# Patient Record
Sex: MALE | Race: WHITE | NOT HISPANIC OR LATINO | Employment: FULL TIME | URBAN - METROPOLITAN AREA
[De-identification: names, ages, dates, MRNs, and addresses within clinical notes are randomized per-mention and may not be internally consistent; named-entity substitution may affect disease eponyms.]

---

## 2019-03-25 ENCOUNTER — HOSPITAL ENCOUNTER (OUTPATIENT)
Dept: OTHER | Facility: HOSPITAL | Age: 59
Discharge: HOME | End: 2019-03-25
Attending: INTERNAL MEDICINE

## 2019-03-25 LAB
25(OH)D3 SERPL-MCNC: 82.7 NG/ML (ref 30–100)
ALBUMIN SERPL-MCNC: 4.2 G/DL (ref 3.5–5)
ALBUMIN/GLOB SERPL: 1.3 {RATIO} (ref 1.4–2.6)
ALP SERPL-CCNC: 105 U/L (ref 56–119)
ALT SERPL-CCNC: 27 U/L (ref 10–40)
ANION GAP SERPL CALC-SCNC: 18 MMOL/L (ref 8–19)
AST SERPL-CCNC: 26 U/L (ref 15–50)
BASOPHILS # BLD AUTO: 0.04 10*3/UL (ref 0–0.2)
BASOPHILS NFR BLD AUTO: 0.8 % (ref 0–3)
BILIRUB SERPL-MCNC: 0.63 MG/DL (ref 0.2–1.3)
BUN SERPL-MCNC: 11 MG/DL (ref 5–25)
BUN/CREAT SERPL: 10 {RATIO} (ref 6–20)
CALCIUM SERPL-MCNC: 9.5 MG/DL (ref 8.7–10.4)
CHLORIDE SERPL-SCNC: 100 MMOL/L (ref 99–111)
CHOLEST SERPL-MCNC: 175 MG/DL (ref 107–200)
CHOLEST/HDLC SERPL: 2.6 {RATIO} (ref 3–6)
CK SERPL-CCNC: 281 U/L (ref 57–374)
CONV ABS IMM GRAN: 0.02 10*3/UL (ref 0–0.2)
CONV CO2: 26 MMOL/L (ref 22–32)
CONV IMMATURE GRAN: 0.4 % (ref 0–1.8)
CONV TOTAL PROTEIN: 7.4 G/DL (ref 6.3–8.2)
CREAT UR-MCNC: 1.06 MG/DL (ref 0.7–1.2)
DEPRECATED RDW RBC AUTO: 42.5 FL (ref 35.1–43.9)
EOSINOPHIL # BLD AUTO: 0.14 10*3/UL (ref 0–0.7)
EOSINOPHIL # BLD AUTO: 2.7 % (ref 0–7)
ERYTHROCYTE [DISTWIDTH] IN BLOOD BY AUTOMATED COUNT: 12.5 % (ref 11.6–14.4)
EST. AVERAGE GLUCOSE BLD GHB EST-MCNC: 123 MG/DL
GFR SERPLBLD BASED ON 1.73 SQ M-ARVRAT: >60 ML/MIN/{1.73_M2}
GLOBULIN UR ELPH-MCNC: 3.2 G/DL (ref 2–3.5)
GLUCOSE SERPL-MCNC: 116 MG/DL (ref 70–99)
HBA1C MFR BLD: 16 G/DL (ref 14–18)
HBA1C MFR BLD: 5.9 % (ref 3.5–5.7)
HCT VFR BLD AUTO: 46 % (ref 42–52)
HDLC SERPL-MCNC: 67 MG/DL (ref 40–60)
LDLC SERPL CALC-MCNC: 93 MG/DL (ref 70–100)
LYMPHOCYTES # BLD AUTO: 1.47 10*3/UL (ref 1–5)
MCH RBC QN AUTO: 32.3 PG (ref 27–31)
MCHC RBC AUTO-ENTMCNC: 34.8 G/DL (ref 33–37)
MCV RBC AUTO: 92.7 FL (ref 80–96)
MONOCYTES # BLD AUTO: 0.48 10*3/UL (ref 0.2–1.2)
MONOCYTES NFR BLD AUTO: 9.3 % (ref 3–10)
NEUTROPHILS # BLD AUTO: 3.02 10*3/UL (ref 2–8)
NEUTROPHILS NFR BLD AUTO: 58.4 % (ref 30–85)
NRBC CBCN: 0 % (ref 0–0.7)
OSMOLALITY SERPL CALC.SUM OF ELEC: 288 MOSM/KG (ref 273–304)
PLATELET # BLD AUTO: 151 10*3/UL (ref 130–400)
PMV BLD AUTO: 9.4 FL (ref 9.4–12.4)
POTASSIUM SERPL-SCNC: 4.7 MMOL/L (ref 3.5–5.3)
RBC # BLD AUTO: 4.96 10*6/UL (ref 4.7–6.1)
SODIUM SERPL-SCNC: 139 MMOL/L (ref 135–147)
TRIGL SERPL-MCNC: 77 MG/DL (ref 40–150)
VARIANT LYMPHS NFR BLD MANUAL: 28.4 % (ref 20–45)
VLDLC SERPL-MCNC: 15 MG/DL (ref 5–37)
WBC # BLD AUTO: 5.17 10*3/UL (ref 4.8–10.8)

## 2019-11-14 ENCOUNTER — CONVERSION ENCOUNTER (OUTPATIENT)
Dept: GASTROENTEROLOGY | Facility: CLINIC | Age: 59
End: 2019-11-14
Attending: INTERNAL MEDICINE

## 2020-02-04 ENCOUNTER — HOSPITAL ENCOUNTER (OUTPATIENT)
Dept: GASTROENTEROLOGY | Facility: HOSPITAL | Age: 60
Setting detail: HOSPITAL OUTPATIENT SURGERY
Discharge: HOME | End: 2020-02-04
Attending: INTERNAL MEDICINE

## 2021-03-03 ENCOUNTER — HOSPITAL ENCOUNTER (OUTPATIENT)
Dept: VACCINE CLINIC | Facility: HOSPITAL | Age: 61
Discharge: HOME | End: 2021-03-03
Attending: INTERNAL MEDICINE

## 2021-03-24 ENCOUNTER — HOSPITAL ENCOUNTER (OUTPATIENT)
Dept: VACCINE CLINIC | Facility: HOSPITAL | Age: 61
Discharge: HOME | End: 2021-03-24
Attending: INTERNAL MEDICINE

## 2023-02-08 ENCOUNTER — HOSPITAL ENCOUNTER (EMERGENCY)
Facility: HOSPITAL | Age: 63
Discharge: HOME | End: 2023-02-08
Attending: STUDENT IN AN ORGANIZED HEALTH CARE EDUCATION/TRAINING PROGRAM | Admitting: STUDENT IN AN ORGANIZED HEALTH CARE EDUCATION/TRAINING PROGRAM
Payer: COMMERCIAL

## 2023-02-08 VITALS
TEMPERATURE: 98.3 F | DIASTOLIC BLOOD PRESSURE: 80 MMHG | SYSTOLIC BLOOD PRESSURE: 122 MMHG | OXYGEN SATURATION: 99 % | HEART RATE: 71 BPM | BODY MASS INDEX: 33.68 KG/M2 | WEIGHT: 248.68 LBS | RESPIRATION RATE: 18 BRPM | HEIGHT: 72 IN

## 2023-02-08 DIAGNOSIS — I10 HYPERTENSION, UNSPECIFIED TYPE: Primary | ICD-10-CM

## 2023-02-08 LAB
ALBUMIN SERPL-MCNC: 4 G/DL (ref 3.5–5.2)
ALBUMIN/GLOB SERPL: 1.3 G/DL
ALP SERPL-CCNC: 133 U/L (ref 39–117)
ALT SERPL W P-5'-P-CCNC: 27 U/L (ref 1–41)
ANION GAP SERPL CALCULATED.3IONS-SCNC: 12.9 MMOL/L (ref 5–15)
AST SERPL-CCNC: 21 U/L (ref 1–40)
BASOPHILS # BLD AUTO: 0.03 10*3/MM3 (ref 0–0.2)
BASOPHILS NFR BLD AUTO: 0.5 % (ref 0–1.5)
BILIRUB SERPL-MCNC: 0.5 MG/DL (ref 0–1.2)
BUN SERPL-MCNC: 14 MG/DL (ref 8–23)
BUN/CREAT SERPL: 13.5 (ref 7–25)
CALCIUM SPEC-SCNC: 9.6 MG/DL (ref 8.6–10.5)
CHLORIDE SERPL-SCNC: 100 MMOL/L (ref 98–107)
CO2 SERPL-SCNC: 24.1 MMOL/L (ref 22–29)
CREAT SERPL-MCNC: 1.04 MG/DL (ref 0.76–1.27)
DEPRECATED RDW RBC AUTO: 43.8 FL (ref 37–54)
EGFRCR SERPLBLD CKD-EPI 2021: 80.7 ML/MIN/1.73
EOSINOPHIL # BLD AUTO: 0.18 10*3/MM3 (ref 0–0.4)
EOSINOPHIL NFR BLD AUTO: 3 % (ref 0.3–6.2)
ERYTHROCYTE [DISTWIDTH] IN BLOOD BY AUTOMATED COUNT: 13 % (ref 12.3–15.4)
GLOBULIN UR ELPH-MCNC: 3.2 GM/DL
GLUCOSE SERPL-MCNC: 157 MG/DL (ref 65–99)
HCT VFR BLD AUTO: 43.5 % (ref 37.5–51)
HGB BLD-MCNC: 15.1 G/DL (ref 13–17.7)
HOLD SPECIMEN: NORMAL
HOLD SPECIMEN: NORMAL
IMM GRANULOCYTES # BLD AUTO: 0.03 10*3/MM3 (ref 0–0.05)
IMM GRANULOCYTES NFR BLD AUTO: 0.5 % (ref 0–0.5)
LYMPHOCYTES # BLD AUTO: 1.57 10*3/MM3 (ref 0.7–3.1)
LYMPHOCYTES NFR BLD AUTO: 26 % (ref 19.6–45.3)
MCH RBC QN AUTO: 32.1 PG (ref 26.6–33)
MCHC RBC AUTO-ENTMCNC: 34.7 G/DL (ref 31.5–35.7)
MCV RBC AUTO: 92.4 FL (ref 79–97)
MONOCYTES # BLD AUTO: 0.49 10*3/MM3 (ref 0.1–0.9)
MONOCYTES NFR BLD AUTO: 8.1 % (ref 5–12)
NEUTROPHILS NFR BLD AUTO: 3.73 10*3/MM3 (ref 1.7–7)
NEUTROPHILS NFR BLD AUTO: 61.9 % (ref 42.7–76)
NRBC BLD AUTO-RTO: 0 /100 WBC (ref 0–0.2)
PLATELET # BLD AUTO: 198 10*3/MM3 (ref 140–450)
PMV BLD AUTO: 9.8 FL (ref 6–12)
POTASSIUM SERPL-SCNC: 4 MMOL/L (ref 3.5–5.2)
PROT SERPL-MCNC: 7.2 G/DL (ref 6–8.5)
RBC # BLD AUTO: 4.71 10*6/MM3 (ref 4.14–5.8)
SODIUM SERPL-SCNC: 137 MMOL/L (ref 136–145)
WBC NRBC COR # BLD: 6.03 10*3/MM3 (ref 3.4–10.8)
WHOLE BLOOD HOLD COAG: NORMAL
WHOLE BLOOD HOLD SPECIMEN: NORMAL

## 2023-02-08 PROCEDURE — 80053 COMPREHEN METABOLIC PANEL: CPT | Performed by: STUDENT IN AN ORGANIZED HEALTH CARE EDUCATION/TRAINING PROGRAM

## 2023-02-08 PROCEDURE — 85025 COMPLETE CBC W/AUTO DIFF WBC: CPT | Performed by: STUDENT IN AN ORGANIZED HEALTH CARE EDUCATION/TRAINING PROGRAM

## 2023-02-08 PROCEDURE — 36415 COLL VENOUS BLD VENIPUNCTURE: CPT | Performed by: STUDENT IN AN ORGANIZED HEALTH CARE EDUCATION/TRAINING PROGRAM

## 2023-02-08 PROCEDURE — 99283 EMERGENCY DEPT VISIT LOW MDM: CPT

## 2023-02-08 RX ORDER — ATORVASTATIN CALCIUM 10 MG/1
TABLET, FILM COATED ORAL
COMMUNITY

## 2023-02-08 RX ORDER — CANDESARTAN 16 MG/1
TABLET ORAL EVERY 24 HOURS
COMMUNITY

## 2023-02-08 RX ORDER — ESCITALOPRAM OXALATE 10 MG/1
TABLET ORAL
COMMUNITY

## 2023-02-08 RX ORDER — ASPIRIN 325 MG
TABLET ORAL
COMMUNITY

## 2023-02-08 RX ORDER — ALLOPURINOL 300 MG/1
TABLET ORAL
COMMUNITY
Start: 2022-12-24

## 2023-02-09 NOTE — DISCHARGE INSTRUCTIONS
Continue to monitor your blood pressure.    Follow-up with your PCP to discuss blood pressure readings.    Return for any new concerns.

## 2023-02-09 NOTE — ED TRIAGE NOTES
Pt to ED from home with reports of frontal headache, facial pressure, and /130.      Pt states he called brother in law who is doctor and was instructed to come to ED.

## 2023-02-09 NOTE — ED PROVIDER NOTES
Time: 7:26 PM EST  Date of encounter:  2/8/2023  Independent Historian/Clinical History and Information was obtained by:   Patient  Chief Complaint   Patient presents with   • Headache   • Hypertension       History is limited by: N/A    History of Present Illness:  Patient is a 62 y.o. year old male who presents to the emergency department for evaluation of hypertension.  Patient states he checked his blood pressure at home due to having a headache and it was in the 200s over 100s.  Patient does have known hypertension and has taken his blood pressure medications today.  Patient denies any recent head injury, or blurry vision.  Patient does state he has a mild headache.  Patient states he is unsure if this was related to the sinus congestion that he has had for the past couple of weeks.  (Provider in triage, Darrell Tucker PA-C)    Patient reports that he checked blood pressure with a wrist monitor.  He denies shortness of breath, blurry vision or chest pain.    HPI    Patient Care Team  Primary Care Provider: Maria Elena Jimenez MD    Past Medical History:     No Known Allergies  Past Medical History:   Diagnosis Date   • Anxiety    • Depression    • Detached retina    • Gout    • Hypercholesterolemia    • Hypertension      Past Surgical History:   Procedure Laterality Date   • COLONOSCOPY N/A    • HERNIA REPAIR     • RETINAL DETACHMENT REPAIR     • VASECTOMY       History reviewed. No pertinent family history.    Home Medications:  Prior to Admission medications    Not on File        Social History:   Social History     Tobacco Use   • Smoking status: Never   • Smokeless tobacco: Never   Substance Use Topics   • Alcohol use: Yes     Comment: weekends and special occ         Review of Systems:  Review of Systems   Constitutional: Negative for chills and fever.   HENT: Positive for postnasal drip and sinus pressure. Negative for congestion, ear pain and sore throat.    Eyes: Negative for pain and visual disturbance.  "  Respiratory: Negative for cough, chest tightness and shortness of breath.    Cardiovascular: Negative for chest pain.   Gastrointestinal: Negative for abdominal pain, diarrhea, nausea and vomiting.   Genitourinary: Negative for flank pain and hematuria.   Musculoskeletal: Negative for joint swelling.   Skin: Negative for pallor.   Neurological: Positive for headaches. Negative for dizziness and seizures.   All other systems reviewed and are negative.       Physical Exam:  /80 (BP Location: Right arm, Patient Position: Sitting)   Pulse 71   Temp 98.3 °F (36.8 °C) (Oral)   Resp 18   Ht 182.9 cm (72\")   Wt 113 kg (248 lb 10.9 oz)   SpO2 99%   BMI 33.73 kg/m²     Physical Exam  Vitals and nursing note reviewed.   Constitutional:       General: He is not in acute distress.     Appearance: Normal appearance. He is not ill-appearing or toxic-appearing.   HENT:      Head: Normocephalic and atraumatic.      Nose: Nose normal.      Comments: Maxillary and frontal sinuses transilluminate well     Mouth/Throat:      Lips: Pink.      Mouth: Mucous membranes are moist.      Pharynx: No oropharyngeal exudate.      Tonsils: No tonsillar exudate.      Comments: Cobblestoning posterior pharynx  Eyes:      General: No scleral icterus.     Extraocular Movements: Extraocular movements intact.      Conjunctiva/sclera: Conjunctivae normal.   Cardiovascular:      Rate and Rhythm: Normal rate and regular rhythm.      Pulses:           Radial pulses are 2+ on the right side and 2+ on the left side.      Heart sounds: Normal heart sounds.   Pulmonary:      Effort: Pulmonary effort is normal. No respiratory distress.      Breath sounds: Normal breath sounds.   Abdominal:      General: Abdomen is protuberant. Bowel sounds are normal. There is no distension.      Palpations: Abdomen is soft.      Tenderness: There is no abdominal tenderness.   Musculoskeletal:         General: Normal range of motion.      Cervical back: Normal " range of motion and neck supple.   Skin:     General: Skin is warm and dry.      Coloration: Skin is not cyanotic.   Neurological:      Mental Status: He is alert and oriented to person, place, and time. Mental status is at baseline.      Sensory: Sensation is intact.      Motor: Motor function is intact.      Coordination: Coordination is intact.      Gait: Gait is intact.   Psychiatric:         Attention and Perception: Attention and perception normal.         Mood and Affect: Mood normal.                   Procedures:  Procedures      Medical Decision Making:      Comorbidities that affect care:        External Notes reviewed:          The following orders were placed and all results were independently analyzed by me:  Orders Placed This Encounter   Procedures   • Blood Pressure Device   • Comprehensive Metabolic Panel   • [place] Draw   • CBC Auto Differential   • CBC & Differential   • Green Top (Gel)   • Lavender Top   • Gold Top - SST   • Light Blue Top       Medications Given in the Emergency Department:  Medications - No data to display     ED Course:    The patient was initially evaluated in the triage area where orders were placed. The patient was later dispositioned by JOSTIN Rojas.      The patient was advised to stay for completion of workup which includes but is not limited to communication of labs and radiological results, reassessment and plan. The patient was advised that leaving prior to disposition by a provider could result in critical findings that are not communicated to the patient.     ED Course as of 02/09/23 0451   Wed Feb 08, 2023   1926 PROVIDER IN TRIAGE  Patient was evaluated by me in triage, Darrell Tucekr PA-C.  Orders were placed and patient is currently awaiting final results and disposition.  [MD]      ED Course User Index  [MD] Darrell Tucker PA-C       Labs:    Lab Results (last 24 hours)     Procedure Component Value Units Date/Time    CBC & Differential [[id number]]   (Normal) Collected: 02/08/23 1922    Specimen: Blood Updated: 02/08/23 2049    Narrative:      The following orders were created for panel order CBC & Differential.  Procedure                               Abnormality         Status                     ---------                               -----------         ------                     CBC Auto Differential[[id number]]        Normal              Final result                 Please view results for these tests on the individual orders.    Comprehensive Metabolic Panel [[id number]]  (Abnormal) Collected: 02/08/23 1922    Specimen: Blood Updated: 02/08/23 2115     Glucose 157 mg/dL      BUN 14 mg/dL      Creatinine 1.04 mg/dL      Sodium 137 mmol/L      Potassium 4.0 mmol/L      Chloride 100 mmol/L      CO2 24.1 mmol/L      Calcium 9.6 mg/dL      Total Protein 7.2 g/dL      Albumin 4.0 g/dL      ALT (SGPT) 27 U/L      AST (SGOT) 21 U/L      Alkaline Phosphatase 133 U/L      Total Bilirubin 0.5 mg/dL      Globulin 3.2 gm/dL      A/G Ratio 1.3 g/dL      BUN/Creatinine Ratio 13.5     Anion Gap 12.9 mmol/L      eGFR 80.7 mL/min/1.73     Narrative:      GFR Normal >60  Chronic Kidney Disease <60  Kidney Failure <15      CBC Auto Differential [[id number]]  (Normal) Collected: 02/08/23 1922    Specimen: Blood Updated: 02/08/23 2049     WBC 6.03 10*3/mm3      RBC 4.71 10*6/mm3      Hemoglobin 15.1 g/dL      Hematocrit 43.5 %      MCV 92.4 fL      MCH 32.1 pg      MCHC 34.7 g/dL      RDW 13.0 %      RDW-SD 43.8 fl      MPV 9.8 fL      Platelets 198 10*3/mm3      Neutrophil % 61.9 %      Lymphocyte % 26.0 %      Monocyte % 8.1 %      Eosinophil % 3.0 %      Basophil % 0.5 %      Immature Grans % 0.5 %      Neutrophils, Absolute 3.73 10*3/mm3      Lymphocytes, Absolute 1.57 10*3/mm3      Monocytes, Absolute 0.49 10*3/mm3      Eosinophils, Absolute 0.18 10*3/mm3      Basophils, Absolute 0.03 10*3/mm3      Immature Grans, Absolute 0.03 10*3/mm3      nRBC 0.0 /100 WBC             Imaging:    No Radiology Exams Resulted Within Past 24 Hours      Differential Diagnosis and Discussion:      Headache: Differential diagnosis includes but is not limited to migraine, cluster headache, hypertension, tumor, subarachnoid bleeding, pseudotumor cerebri, temporal arteritis, infections, tension headache, and TMJ syndrome.        MDM  Number of Diagnoses or Management Options  Hypertension, unspecified type  Diagnosis management comments: Patient presented with concern for high blood pressure and a headache.  Headache was nearly resolved upon arrival.  Patient reports that he used a wrist monitor to check his blood pressure and it read very high twice.  Upon arrival to ED patient's blood pressure was only minimally elevated.  Patient and significant other think that their monitor may be faulty.  On recheck blood pressure was within normal limits.  I will give the patient prescription for new blood pressure monitor.  He will continue to monitor his blood pressure and seek care should it become elevated again. I do not believe that the patient has an acute emergency medical condition requiring additional emergency management at this time. The patient is currently stable for outpatient treatment and continuation of care. Important signs and symptoms that would warrant return to the emergency department were reviewed. The patient was provided the opportunity to ask questions. All questions were addressed and the patient was discharged from the ED. The patient demonstrated understanding and agreed to plan       Amount and/or Complexity of Data Reviewed  Clinical lab tests: reviewed and ordered  Decide to obtain previous medical records or to obtain history from someone other than the patient: yes    Risk of Complications, Morbidity, and/or Mortality  Presenting problems: moderate  Diagnostic procedures: low  Management options: low    Patient Progress  Patient progress: improved           Patient Care  Considerations:    CT HEAD: I considered ordering a noncontrast CT of the head, however Patient's headache was nearly completely resolved during visit      Consultants/Shared Management Plan:        Social Determinants of Health:    Patient is independent, reliable, and has access to care.       Disposition and Care Coordination:    Discharged: The patient is suitable and stable for discharge with no need for consideration of observation or admission.        Final diagnoses:   Hypertension, unspecified type        ED Disposition     ED Disposition   Discharge    Condition   Stable    Comment   --             This medical record created using voice recognition software.           Summer Maya, APRN  02/09/23 0457

## 2023-07-13 PROBLEM — Z86.010 HISTORY OF COLON POLYPS: Status: ACTIVE | Noted: 2023-07-13

## 2023-07-13 PROBLEM — Z86.0100 HISTORY OF COLON POLYPS: Status: ACTIVE | Noted: 2023-07-13

## 2023-08-17 ENCOUNTER — OFFICE VISIT (OUTPATIENT)
Dept: ORTHOPEDIC SURGERY | Facility: CLINIC | Age: 63
End: 2023-08-17
Payer: COMMERCIAL

## 2023-08-17 VITALS
BODY MASS INDEX: 33.59 KG/M2 | HEART RATE: 68 BPM | HEIGHT: 72 IN | DIASTOLIC BLOOD PRESSURE: 85 MMHG | SYSTOLIC BLOOD PRESSURE: 128 MMHG | OXYGEN SATURATION: 96 % | WEIGHT: 248 LBS

## 2023-08-17 DIAGNOSIS — M25.561 RIGHT KNEE PAIN, UNSPECIFIED CHRONICITY: Primary | ICD-10-CM

## 2023-08-17 DIAGNOSIS — M17.11 OSTEOARTHRITIS OF RIGHT KNEE, UNSPECIFIED OSTEOARTHRITIS TYPE: ICD-10-CM

## 2023-08-17 RX ORDER — DICLOFENAC SODIUM 75 MG/1
75 TABLET, DELAYED RELEASE ORAL 2 TIMES DAILY
Qty: 60 TABLET | Refills: 0 | Status: SHIPPED | OUTPATIENT
Start: 2023-08-17

## 2023-08-17 RX ORDER — POLYETHYLENE GLYCOL-3350 AND ELECTROLYTES 236; 6.74; 5.86; 2.97; 22.74 G/274.31G; G/274.31G; G/274.31G; G/274.31G; G/274.31G
POWDER, FOR SOLUTION ORAL
COMMUNITY
Start: 2023-07-11

## 2023-08-17 RX ORDER — DICLOFENAC SODIUM 75 MG/1
TABLET, DELAYED RELEASE ORAL
COMMUNITY
Start: 2023-08-01

## 2023-08-17 NOTE — PROGRESS NOTES
"Chief Complaint  Initial Evaluation of the Right Knee     Subjective      Agus Church presents to [facility] for initial evaluation of the right knee.  He has had pain in the right knee for years.  He has had injections that lasted a couple of week, anti inflammatories and exercises in the past.  He has changed his gait over the years that had decreased some pain.  He had X rays from his PCP.  He stands a lot of work and has had increase pain with standing and ambulation.     No Known Allergies     Social History     Socioeconomic History    Marital status:    Tobacco Use    Smoking status: Never    Smokeless tobacco: Never   Vaping Use    Vaping Use: Never used   Substance and Sexual Activity    Alcohol use: Yes     Comment: weekends and special occ    Drug use: Defer    Sexual activity: Defer        I reviewed the patient's chief complaint, history of present illness, review of systems, past medical history, surgical history, family history, social history, medications, and allergy list.     Review of Systems     Constitutional: Denies fevers, chills, weight loss  Cardiovascular: Denies chest pain, shortness of breath  Skin: Denies rashes, acute skin changes  Neurologic: Denies headache, loss of consciousness        Vital Signs:   /85   Pulse 68   Ht 182.9 cm (72\")   Wt 112 kg (248 lb)   SpO2 96%   BMI 33.63 kg/mý          Physical Exam  General: Alert. No acute distress    Ortho Exam        RIGHT KNEE Flexion 120. Extension 0. Stable to varus/valgus stress. Stable to anterior/posterior drawer. Neurovascularly intact. Negative Nicki. Negative Lachman. Positive EHL, FHL, HS and TA. Sensation intact to light touch all 5 nerves of the foot. Ambulates with Antalgic gait. Patella is well tracking. Calf supple, non-tender. Positive tenderness to the medial joint line. Positive tenderness to the lateral joint line. Positive Crepitus. Good strength to hamstrings, " quadriceps, dorsiflexors, and plantar flexors.  Knee Extensor Mechanism intact        Procedures      Imaging Results (Most Recent)       None             Result Review :     X-Ray Report:  Right knee X-Ray  Indication: Evaluation of the right knee  AP/Lateral and [place] view(s)  Findings: Moderately Advanced degenerative arthritis. Mild patella tilt  Prior studies available for comparison: yes       PROCEDURE: Right knee series  REASON FOR EXAM: Chronic right knee pain  COMPARISON: None  TECHNIQUE: AP lateral oblique and sunrise views of the right knee were obtained  FINDINGS: There is no fracture or effusion. There is some osteophyte formation at the patellofemoral joint space there is mild  narrowing of the medial joint space with osteophyte formation from the medial femoral condyle  IMPRESSION: No acute findings. Medial joint and patellofemoral joint degenerative changes are present        Assessment and Plan     Diagnoses and all orders for this visit:    1. Right knee pain, unspecified chronicity (Primary)    2. Osteoarthritis of right knee, unspecified osteoarthritis type        Discussed the treatment plan with the patient. I reviewed the X-rays that were obtained at [facility] with the patient.     Discussed the risks and benefits of conservative measures.      Prescribed anti inflammatories.     Submit for visco supplementation.     Call or return if worsening symptoms.    Follow Up     After Visco supplementation is approved.       Patient was given instructions and counseling regarding his condition or for health maintenance advice. Please see specific information pulled into the AVS if appropriate.     Scribed for Jacqueline Jack MD by Kelsy Lucero MA.  08/17/23   15:37 EDT    I have personally performed the services described in this document as scribed by the above individual and it is both accurate and complete. Jacqueline Jack MD 08/18/23

## 2023-09-05 ENCOUNTER — TELEPHONE (OUTPATIENT)
Dept: ORTHOPEDIC SURGERY | Facility: CLINIC | Age: 63
End: 2023-09-05
Payer: COMMERCIAL

## 2023-09-05 NOTE — TELEPHONE ENCOUNTER
PATIENT WAS SEEN ON 08/17 WITH DR. BESS AND VISCO AUTH WAS ORDERED. HE IS CALLING TO CHECK STATUS OF THAT REQUEST. 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 HE VOICED UNDERSTANDING AND IS REQUESTING A CALL TO LET HIM KNOW OF ANY STATUS UPDATE.

## 2023-09-05 NOTE — TELEPHONE ENCOUNTER
Left message for the patient to call back. Wanted to inform the patient that it might not be till next week before we could have an update on gel injections. Wanted to let the patient know so he is aware that we are working on injections but may take a little time.

## 2023-09-12 ENCOUNTER — TELEPHONE (OUTPATIENT)
Dept: GASTROENTEROLOGY | Facility: CLINIC | Age: 63
End: 2023-09-12
Payer: COMMERCIAL

## 2023-09-12 NOTE — TELEPHONE ENCOUNTER
Dr. Berman advised that he will need to be out of the office on 10.6.2023.      Patient has an upcoming ENDO procedure. I called patient to reschedule. No answer. Left message for patient to call back.      Procedure: screening colon

## 2023-09-14 ENCOUNTER — TELEPHONE (OUTPATIENT)
Dept: ORTHOPEDIC SURGERY | Facility: CLINIC | Age: 63
End: 2023-09-14
Payer: COMMERCIAL

## 2023-09-14 NOTE — TELEPHONE ENCOUNTER
DENIED:  CALLED AND LEFT A MSG TO THE PT REGARDING THE DENIAL AND IF HE WANTS TO MAKE AN APPT TO DISCUSS OTHER OPTIONS TO PLS CALL US BACK (9-14-23)

## 2023-09-14 NOTE — TELEPHONE ENCOUNTER
----- Message from Arely Maya sent at 9/6/2023  2:48 PM EDT -----  [place], denied right knee synvsic one injection per Ebony. Please call and schedule with Flores Lomeli or Judi to discuss other options. Thanks

## 2023-09-19 ENCOUNTER — OFFICE VISIT (OUTPATIENT)
Dept: ORTHOPEDIC SURGERY | Facility: CLINIC | Age: 63
End: 2023-09-19
Payer: COMMERCIAL

## 2023-09-19 VITALS
OXYGEN SATURATION: 95 % | BODY MASS INDEX: 33.59 KG/M2 | DIASTOLIC BLOOD PRESSURE: 79 MMHG | HEART RATE: 81 BPM | WEIGHT: 248 LBS | SYSTOLIC BLOOD PRESSURE: 124 MMHG | HEIGHT: 72 IN

## 2023-09-19 DIAGNOSIS — M17.11 OSTEOARTHRITIS OF RIGHT KNEE, UNSPECIFIED OSTEOARTHRITIS TYPE: Primary | ICD-10-CM

## 2023-09-19 RX ADMIN — LIDOCAINE HYDROCHLORIDE 5 ML: 10 INJECTION, SOLUTION INFILTRATION; PERINEURAL at 15:52

## 2023-09-19 RX ADMIN — TRIAMCINOLONE ACETONIDE 40 MG: 40 INJECTION, SUSPENSION INTRA-ARTICULAR; INTRAMUSCULAR at 15:52

## 2023-09-19 NOTE — PROGRESS NOTES
"Chief Complaint  Follow-up of the Right Knee    Subjective      Agus Church presents to [facility] for right knee pain and osteoarthritits. He states this has been going on for 3-4 years. He has had previous right knee injections administered by his PCP, last injection was in June, and those provided minimal relief. He has not tried PT yet. He has been prescribed diclofenac and that has helped some. He states he is always standing on his feet at work (he is an ) and that causes a lot of pain for him.  Insurance did not approve Visco injection at this time. He states he is ready to discuss joint replacement surgery, although would like to defer this until December.    Objective   No Known Allergies    Vital Signs:   /79   Pulse 81   Ht 182.9 cm (72\")   Wt 112 kg (248 lb)   SpO2 95%   BMI 33.63 kg/m²       Physical Exam    Constitutional: Awake, alert. Well nourished appearance.    Integumentary: Warm, dry, intact. No obvious rashes.    HENT: Atraumatic, normocephalic.   Respiratory: Non labored respirations .   Cardiovascular: Intact peripheral pulses.    Psychiatric: Normal mood and affect. A&O X3    Ortho Exam  Right knee: Skin is warm, dry, and intact.  Tenderness to palpation of joint lines.  Crepitus with ROM.  Full knee extension and flexion to 125 degrees.  Full plantarflexion and dorsiflexion of the ankle.  Sensation intact light touch.  Distal neurovascular intact.  Smooth sit to stand transition.  Patient fully weightbearing with nonantalgic gait.    Imaging Results (Most Recent)       None             Large Joint Arthrocentesis  Date/Time: 9/19/2023 3:52 PM  Consent given by: patient  Site marked: site marked  Timeout: Immediately prior to procedure a time out was called to verify the correct patient, procedure, equipment, support staff and site/side marked as required   Supporting Documentation  Indications: pain   Procedure Details  Location: - "   Location: RIGHT KNEE.Needle gauge: 21G.  Medications administered: 5 mL lidocaine 1 %; 40 mg triamcinolone acetonide 40 MG/ML  Patient tolerance: patient tolerated the procedure well with no immediate complications          Assessment and Plan   Problem List Items Addressed This Visit    None  Visit Diagnoses       Osteoarthritis of right knee, unspecified osteoarthritis type    -  Primary          Follow Up   Return in about 6 weeks (around 10/31/2023).    Tobacco Use: Low Risk     Smoking Tobacco Use: Never    Smokeless Tobacco Use: Never    Passive Exposure: Not on file     Patient is a non-smoker.  Did not discuss tobacco cessation options.    Patient Instructions   Right knee steroid injection administered in office today.  Patient advised on 3 months duration between injections.  Patient is considering right TKA later this year, hoping to have this done in early December.  Follow-up in early November with repeat x-rays of the knee.  We will discuss TKA at that time, if knee remains painful.  Call with changes or concerns.      Patient was given instructions and counseling regarding his condition or for health maintenance advice. Please see specific information pulled into the AVS if appropriate.

## 2023-09-19 NOTE — PATIENT INSTRUCTIONS
Right knee steroid injection administered in office today.  Patient advised on 3 months duration between injections.  Patient is considering right TKA later this year, hoping to have this done in early December.  Follow-up in early November with repeat x-rays of the knee.  We will discuss TKA at that time, if knee remains painful.  Call with changes or concerns.

## 2023-09-21 RX ORDER — TRIAMCINOLONE ACETONIDE 40 MG/ML
40 INJECTION, SUSPENSION INTRA-ARTICULAR; INTRAMUSCULAR
Status: COMPLETED | OUTPATIENT
Start: 2023-09-19 | End: 2023-09-19

## 2023-09-21 RX ORDER — LIDOCAINE HYDROCHLORIDE 10 MG/ML
5 INJECTION, SOLUTION INFILTRATION; PERINEURAL
Status: COMPLETED | OUTPATIENT
Start: 2023-09-19 | End: 2023-09-19

## 2023-10-13 NOTE — PRE-PROCEDURE INSTRUCTIONS
"Instructed on date and arrival time of 1030. Come to entrance \"C\". Must have  over age 18 to drive home.  May have two visitors; however, children under 12 must stay in waiting room.  Discussed clear liquid diet (no red or purple) and bowel prep.  May take medications as usual except for blood thinners, diabetic medications, and weight loss medications.  Bring list of medications.  Verbalized understanding of instructions given.  Instructed to call for questions or concerns.  "

## 2023-10-25 ENCOUNTER — ANESTHESIA EVENT (OUTPATIENT)
Dept: GASTROENTEROLOGY | Facility: HOSPITAL | Age: 63
End: 2023-10-25
Payer: COMMERCIAL

## 2023-10-25 ENCOUNTER — ANESTHESIA (OUTPATIENT)
Dept: GASTROENTEROLOGY | Facility: HOSPITAL | Age: 63
End: 2023-10-25
Payer: COMMERCIAL

## 2023-10-25 ENCOUNTER — HOSPITAL ENCOUNTER (OUTPATIENT)
Facility: HOSPITAL | Age: 63
Setting detail: HOSPITAL OUTPATIENT SURGERY
Discharge: HOME | End: 2023-10-25
Attending: INTERNAL MEDICINE | Admitting: INTERNAL MEDICINE
Payer: COMMERCIAL

## 2023-10-25 VITALS
RESPIRATION RATE: 19 BRPM | BODY MASS INDEX: 33.19 KG/M2 | DIASTOLIC BLOOD PRESSURE: 73 MMHG | WEIGHT: 244.71 LBS | SYSTOLIC BLOOD PRESSURE: 106 MMHG | TEMPERATURE: 97.2 F | OXYGEN SATURATION: 93 % | HEART RATE: 57 BPM

## 2023-10-25 DIAGNOSIS — Z86.010 HISTORY OF COLON POLYPS: ICD-10-CM

## 2023-10-25 PROCEDURE — 25810000003 LACTATED RINGERS PER 1000 ML

## 2023-10-25 PROCEDURE — 88305 TISSUE EXAM BY PATHOLOGIST: CPT | Performed by: INTERNAL MEDICINE

## 2023-10-25 PROCEDURE — 45385 COLONOSCOPY W/LESION REMOVAL: CPT | Performed by: INTERNAL MEDICINE

## 2023-10-25 PROCEDURE — 25010000002 PROPOFOL 10 MG/ML EMULSION

## 2023-10-25 RX ORDER — LIDOCAINE HYDROCHLORIDE 20 MG/ML
INJECTION, SOLUTION EPIDURAL; INFILTRATION; INTRACAUDAL; PERINEURAL AS NEEDED
Status: DISCONTINUED | OUTPATIENT
Start: 2023-10-25 | End: 2023-10-25 | Stop reason: SURG

## 2023-10-25 RX ORDER — SODIUM CHLORIDE, SODIUM LACTATE, POTASSIUM CHLORIDE, CALCIUM CHLORIDE 600; 310; 30; 20 MG/100ML; MG/100ML; MG/100ML; MG/100ML
30 INJECTION, SOLUTION INTRAVENOUS CONTINUOUS
Status: DISCONTINUED | OUTPATIENT
Start: 2023-10-25 | End: 2023-10-25 | Stop reason: HOSPADM

## 2023-10-25 RX ORDER — PROPOFOL 10 MG/ML
VIAL (ML) INTRAVENOUS AS NEEDED
Status: DISCONTINUED | OUTPATIENT
Start: 2023-10-25 | End: 2023-10-25 | Stop reason: SURG

## 2023-10-25 RX ADMIN — LIDOCAINE HYDROCHLORIDE 100 MG: 20 INJECTION, SOLUTION EPIDURAL; INFILTRATION; INTRACAUDAL; PERINEURAL at 12:22

## 2023-10-25 RX ADMIN — PROPOFOL 50 MG: 10 INJECTION, EMULSION INTRAVENOUS at 12:26

## 2023-10-25 RX ADMIN — SODIUM CHLORIDE, POTASSIUM CHLORIDE, SODIUM LACTATE AND CALCIUM CHLORIDE: 600; 310; 30; 20 INJECTION, SOLUTION INTRAVENOUS at 12:20

## 2023-10-25 RX ADMIN — PROPOFOL 50 MG: 10 INJECTION, EMULSION INTRAVENOUS at 12:28

## 2023-10-25 RX ADMIN — PROPOFOL 200 MCG/KG/MIN: 10 INJECTION, EMULSION INTRAVENOUS at 12:22

## 2023-10-25 NOTE — ANESTHESIA PREPROCEDURE EVALUATION
Anesthesia Evaluation     NPO Solid Status: > 8 hours  NPO Liquid Status: > 2 hours           Airway   Mallampati: II  TM distance: >3 FB  Neck ROM: full  Large neck circumference and No difficulty expected  Dental - normal exam     Pulmonary    Cardiovascular     (+) hypertension less than 2 medications, hyperlipidemia      Neuro/Psych  (+) psychiatric history Depression and Anxiety  GI/Hepatic/Renal/Endo      Musculoskeletal     Abdominal    Substance History      OB/GYN          Other                          Anesthesia Plan    ASA 2     general   total IV anesthesia  intravenous induction     Anesthetic plan, risks, benefits, and alternatives have been provided, discussed and informed consent has been obtained with: patient.    Plan discussed with CRNA.        CODE STATUS:

## 2023-10-25 NOTE — H&P
Pre Procedure History & Physical    Chief Complaint:   Past history of colon polyps    Subjective     HPI:   History of colon polyps    Past Medical History:   Past Medical History:   Diagnosis Date    Anxiety     Colon polyp     Depression     Detached retina     Gout     Hypercholesterolemia     Hypertension        Past Surgical History:  Past Surgical History:   Procedure Laterality Date    COLONOSCOPY N/A 2020    HERNIA REPAIR      RETINAL DETACHMENT REPAIR      VASECTOMY         Family History:  Family History   Problem Relation Age of Onset    Colon cancer Neg Hx        Social History:   reports that he has never smoked. He has never used smokeless tobacco. He reports current alcohol use. Drug use questions deferred to the physician.    Medications:   Medications Prior to Admission   Medication Sig Dispense Refill Last Dose    allopurinol (ZYLOPRIM) 300 MG tablet    Past Week    atorvastatin (LIPITOR) 10 MG tablet    Past Week    candesartan (ATACAND) 16 MG tablet Daily.   Past Week    Cyanocobalamin (VITAMIN B-12 CR PO) Take  by mouth.   Past Week    escitalopram (LEXAPRO) 10 MG tablet Lexapro 10 mg oral tablet take 1 tablet (10 mg) by oral route once daily   Active   Past Week    loratadine-pseudoephedrine (CLARITIN-D 24-hour)  MG per 24 hr tablet Loratadine-D  mg oral tablet extended release 24 hr take 1 tablet by oral route once daily   Active   Past Week    VITAMIN D PO Take  by mouth.   Past Week    diclofenac (VOLTAREN) 75 MG EC tablet Take 1 tablet by mouth 2 (Two) Times a Day. 60 tablet 0     GaviLyte-G 236 g solution  (Patient not taking: Reported on 9/19/2023)          Allergies:  Patient has no known allergies.        Objective     Blood pressure 138/85, pulse 64, temperature 97.5 °F (36.4 °C), temperature source Temporal, resp. rate 16, weight 111 kg (244 lb 11.4 oz), SpO2 95%.    Physical Exam   Constitutional: Pt is oriented to person, place, and time and well-developed,  well-nourished, and in no distress.   Mouth/Throat: Oropharynx is clear and moist.   Neck: Normal range of motion.   Cardiovascular: Normal rate, regular rhythm and normal heart sounds.    Pulmonary/Chest: Effort normal and breath sounds normal.   Abdominal: Soft. Nontender  Skin: Skin is warm and dry.   Psychiatric: Mood, memory, affect and judgment normal.     Assessment & Plan     Diagnosis:  Past history of colon polyps    Anticipated Surgical Procedure:  Colonoscopy    The risks, benefits, and alternatives of this procedure have been discussed with the patient or the responsible party- the patient understands and agrees to proceed.

## 2023-10-25 NOTE — ANESTHESIA POSTPROCEDURE EVALUATION
Patient: Agus Church    Procedure Summary       Date: 10/25/23 Room / Location: [facility] ENDOSCOPY 3 / [facility]    Anesthesia Start: 1220 Anesthesia Stop: 1243    Procedure: COLONOSCOPY WITH COLD SNARE POLYPECTOMY Diagnosis:       History of colon polyps      (History of colon polyps [Z86.010])    Surgeons: Anthony Berman MD Provider:     Anesthesia Type: general ASA Status: 2            Anesthesia Type: general    Vitals  Vitals Value Taken Time   /73 10/25/23 1258   Temp 36.2 °C (97.2 °F) 10/25/23 1257   Pulse 65 10/25/23 1258   Resp 19 10/25/23 1257   SpO2 95 % 10/25/23 1258   Vitals shown include unfiled device data.        Post Anesthesia Care and Evaluation    Patient location during evaluation: bedside  Patient participation: complete - patient participated  Level of consciousness: awake  Pain score: 0  Pain management: adequate    Airway patency: patent  Anesthetic complications: No anesthetic complications  PONV Status: none  Cardiovascular status: acceptable and stable  Respiratory status: acceptable

## 2023-10-27 LAB
CYTO UR: NORMAL
LAB AP CASE REPORT: NORMAL
LAB AP CLINICAL INFORMATION: NORMAL
PATH REPORT.FINAL DX SPEC: NORMAL
PATH REPORT.GROSS SPEC: NORMAL

## 2023-11-07 ENCOUNTER — PREP FOR SURGERY (OUTPATIENT)
Dept: OTHER | Facility: HOSPITAL | Age: 63
End: 2023-11-07
Payer: COMMERCIAL

## 2023-11-07 ENCOUNTER — OFFICE VISIT (OUTPATIENT)
Dept: ORTHOPEDIC SURGERY | Facility: CLINIC | Age: 63
End: 2023-11-07
Payer: COMMERCIAL

## 2023-11-07 VITALS — SYSTOLIC BLOOD PRESSURE: 145 MMHG | HEART RATE: 81 BPM | OXYGEN SATURATION: 93 % | DIASTOLIC BLOOD PRESSURE: 77 MMHG

## 2023-11-07 DIAGNOSIS — M17.11 PRIMARY OSTEOARTHRITIS OF RIGHT KNEE: Primary | ICD-10-CM

## 2023-11-07 DIAGNOSIS — M17.11 OSTEOARTHRITIS OF RIGHT KNEE, UNSPECIFIED OSTEOARTHRITIS TYPE: ICD-10-CM

## 2023-11-07 DIAGNOSIS — M25.561 RIGHT KNEE PAIN, UNSPECIFIED CHRONICITY: Primary | ICD-10-CM

## 2023-11-07 RX ORDER — TRANEXAMIC ACID 10 MG/ML
1000 INJECTION, SOLUTION INTRAVENOUS ONCE
OUTPATIENT
Start: 2023-11-07 | End: 2023-11-07

## 2023-11-07 RX ORDER — CEFAZOLIN SODIUM 2 G/100ML
2 INJECTION, SOLUTION INTRAVENOUS ONCE
OUTPATIENT
Start: 2023-11-07 | End: 2023-11-07

## 2023-11-07 RX ORDER — CEFAZOLIN SODIUM IN 0.9 % NACL 3 G/100 ML
3 INTRAVENOUS SOLUTION, PIGGYBACK (ML) INTRAVENOUS ONCE
OUTPATIENT
Start: 2023-11-07 | End: 2023-11-07

## 2023-11-08 NOTE — PATIENT INSTRUCTIONS
X-rays taken, reviewed, and results discussed with patient.  Did discuss options including physical therapy, home exercise program, trial of NSAIDs, steroid injections, viscosupplementation, or surgery.  Unfortunately, insurance has denied viscosupplementation.  He has had minimal and temporary relief with steroid injections in the past.  He is ready to proceed with total knee arthroplasty.  Discussed with Dr. Jack who is in agreement.  Orders placed.    Discussed surgery. Risks/benefits discussed with patient including, but not limited to: infection, bleeding, neurovascular damage, malunion, nonunion, aesthetic deformity, need for further surgery, and death. Discussed with patient the implant type being used during surgery and patient understands and desires to proceed. Surgery pamphlet provided to patient. Call or return if worsening symptoms.

## 2023-11-08 NOTE — PROGRESS NOTES
Chief Complaint  Follow-up of the Right Knee    Subjective      Agus Church presents to [facility] for follow-up of right knee pain and osteoarthritis.  He has managed this conservatively with intermittent steroid injections in the past.  Unfortunately insurance has denied viscosupplementation.  He presents today independently ambulatory without use of assistive device.  States that his knee becomes progressively painful throughout the day. Reports he is ready to proceed with total knee arthroplasty.      Objective   No Known Allergies    Vital Signs:   /77   Pulse 81   SpO2 93%       Physical Exam    Constitutional: Awake, alert. Well nourished appearance.    Integumentary: Warm, dry, intact. No obvious rashes.    HENT: Atraumatic, normocephalic.   Respiratory: Non labored respirations .   Cardiovascular: Intact peripheral pulses.    Psychiatric: Normal mood and affect. A&O X3    Ortho Exam  Right knee: Skin is warm, dry, and intact.  Tenderness to palpation of joint lines.  Crepitus with ROM.  Full knee extension and flexion 125 degrees.  Full plantarflexion dorsiflexion of the ankle.  Distal neurovascular intact.  Smooth sit to stand transition.  Patient full weightbearing with nonantalgic gait.    Imaging Results (Most Recent)       Procedure Component Value Units Date/Time    XR Knee 3 View Right [[id number]] Resulted: 11/08/23 1231     Updated: 11/08/23 1232    Narrative:      X-Ray Report:  Study: X-rays ordered, taken in the office, and reviewed today.   Site: Right knee xray  Indication: Pain  View: AP/Lateral, Standing, and [place] view(s)  Findings: Advanced osteoarthritis of the right knee with bone-on-bone   findings in the medial compartment.  Prior studies available for comparison: yes                       Assessment and Plan   Problem List Items Addressed This Visit    None  Visit Diagnoses       Right knee pain, unspecified chronicity    -  Primary    Relevant  Orders    XR Knee 3 View Right (Completed)    Osteoarthritis of right knee, unspecified osteoarthritis type                Follow Up   Return for Recheck.    Tobacco Use: Low Risk  (11/7/2023)    Patient History     Smoking Tobacco Use: Never     Smokeless Tobacco Use: Never     Passive Exposure: Not on file     Patient is a non-smoker.  Did not discuss tobacco cessation options.    Patient Instructions   X-rays taken, reviewed, and results discussed with patient.  Did discuss options including physical therapy, home exercise program, trial of NSAIDs, steroid injections, viscosupplementation, or surgery.  Unfortunately, insurance has denied viscosupplementation.  He has had minimal and temporary relief with steroid injections in the past.  He is ready to proceed with total knee arthroplasty.  Discussed with Dr. Jack who is in agreement.  Orders placed.    Discussed surgery. Risks/benefits discussed with patient including, but not limited to: infection, bleeding, neurovascular damage, malunion, nonunion, aesthetic deformity, need for further surgery, and death. Discussed with patient the implant type being used during surgery and patient understands and desires to proceed. Surgery pamphlet provided to patient. Call or return if worsening symptoms.    Patient was given instructions and counseling regarding his condition or for health maintenance advice. Please see specific information pulled into the AVS if appropriate.

## 2023-12-04 ENCOUNTER — TELEPHONE (OUTPATIENT)
Dept: ORTHOPEDIC SURGERY | Facility: CLINIC | Age: 63
End: 2023-12-04

## 2023-12-04 DIAGNOSIS — Z01.818 ENCOUNTER FOR PREADMISSION TESTING: Primary | ICD-10-CM

## 2023-12-05 DIAGNOSIS — Z47.1 AFTERCARE FOLLOWING RIGHT KNEE JOINT REPLACEMENT SURGERY: Primary | ICD-10-CM

## 2023-12-05 DIAGNOSIS — Z96.651 AFTERCARE FOLLOWING RIGHT KNEE JOINT REPLACEMENT SURGERY: Primary | ICD-10-CM

## 2023-12-06 ENCOUNTER — PRE-ADMISSION TESTING (OUTPATIENT)
Dept: PREADMISSION TESTING | Facility: HOSPITAL | Age: 63
End: 2023-12-06
Payer: COMMERCIAL

## 2023-12-06 DIAGNOSIS — M17.11 PRIMARY OSTEOARTHRITIS OF RIGHT KNEE: ICD-10-CM

## 2023-12-06 DIAGNOSIS — Z01.818 ENCOUNTER FOR PREADMISSION TESTING: ICD-10-CM

## 2023-12-06 LAB
ALBUMIN SERPL-MCNC: 4.1 G/DL (ref 3.5–5.2)
ALBUMIN/GLOB SERPL: 1.4 G/DL
ALP SERPL-CCNC: 124 U/L (ref 39–117)
ALT SERPL W P-5'-P-CCNC: 19 U/L (ref 1–41)
ANION GAP SERPL CALCULATED.3IONS-SCNC: 12.5 MMOL/L (ref 5–15)
AST SERPL-CCNC: 21 U/L (ref 1–40)
BASOPHILS # BLD AUTO: 0.04 10*3/MM3 (ref 0–0.2)
BASOPHILS NFR BLD AUTO: 0.7 % (ref 0–1.5)
BILIRUB SERPL-MCNC: 0.8 MG/DL (ref 0–1.2)
BUN SERPL-MCNC: 12 MG/DL (ref 8–23)
BUN/CREAT SERPL: 13 (ref 7–25)
CALCIUM SPEC-SCNC: 9.3 MG/DL (ref 8.6–10.5)
CHLORIDE SERPL-SCNC: 104 MMOL/L (ref 98–107)
CO2 SERPL-SCNC: 21.5 MMOL/L (ref 22–29)
CREAT SERPL-MCNC: 0.92 MG/DL (ref 0.76–1.27)
DEPRECATED RDW RBC AUTO: 43.8 FL (ref 37–54)
EGFRCR SERPLBLD CKD-EPI 2021: 92.9 ML/MIN/1.73
EOSINOPHIL # BLD AUTO: 0.09 10*3/MM3 (ref 0–0.4)
EOSINOPHIL NFR BLD AUTO: 1.6 % (ref 0.3–6.2)
ERYTHROCYTE [DISTWIDTH] IN BLOOD BY AUTOMATED COUNT: 12.9 % (ref 12.3–15.4)
GLOBULIN UR ELPH-MCNC: 2.9 GM/DL
GLUCOSE SERPL-MCNC: 190 MG/DL (ref 65–99)
HBA1C MFR BLD: 6.2 % (ref 4.8–5.6)
HCT VFR BLD AUTO: 43.2 % (ref 37.5–51)
HGB BLD-MCNC: 14.8 G/DL (ref 13–17.7)
IMM GRANULOCYTES # BLD AUTO: 0.04 10*3/MM3 (ref 0–0.05)
IMM GRANULOCYTES NFR BLD AUTO: 0.7 % (ref 0–0.5)
INR PPP: 1.03 (ref 0.86–1.15)
LYMPHOCYTES # BLD AUTO: 1.16 10*3/MM3 (ref 0.7–3.1)
LYMPHOCYTES NFR BLD AUTO: 20.2 % (ref 19.6–45.3)
MCH RBC QN AUTO: 31.7 PG (ref 26.6–33)
MCHC RBC AUTO-ENTMCNC: 34.3 G/DL (ref 31.5–35.7)
MCV RBC AUTO: 92.5 FL (ref 79–97)
MONOCYTES # BLD AUTO: 0.39 10*3/MM3 (ref 0.1–0.9)
MONOCYTES NFR BLD AUTO: 6.8 % (ref 5–12)
NEUTROPHILS NFR BLD AUTO: 4.01 10*3/MM3 (ref 1.7–7)
NEUTROPHILS NFR BLD AUTO: 70 % (ref 42.7–76)
NRBC BLD AUTO-RTO: 0 /100 WBC (ref 0–0.2)
PLATELET # BLD AUTO: 192 10*3/MM3 (ref 140–450)
PMV BLD AUTO: 9.4 FL (ref 6–12)
POTASSIUM SERPL-SCNC: 3.9 MMOL/L (ref 3.5–5.2)
PROT SERPL-MCNC: 7 G/DL (ref 6–8.5)
PROTHROMBIN TIME: 13.8 SECONDS (ref 11.8–14.9)
QT INTERVAL: 410 MS
QTC INTERVAL: 428 MS
RBC # BLD AUTO: 4.67 10*6/MM3 (ref 4.14–5.8)
SODIUM SERPL-SCNC: 138 MMOL/L (ref 136–145)
WBC NRBC COR # BLD AUTO: 5.73 10*3/MM3 (ref 3.4–10.8)

## 2023-12-06 PROCEDURE — 83036 HEMOGLOBIN GLYCOSYLATED A1C: CPT

## 2023-12-06 PROCEDURE — 85610 PROTHROMBIN TIME: CPT

## 2023-12-06 PROCEDURE — 85025 COMPLETE CBC W/AUTO DIFF WBC: CPT

## 2023-12-06 PROCEDURE — 36415 COLL VENOUS BLD VENIPUNCTURE: CPT

## 2023-12-06 PROCEDURE — 93005 ELECTROCARDIOGRAM TRACING: CPT

## 2023-12-06 PROCEDURE — 80053 COMPREHEN METABOLIC PANEL: CPT

## 2023-12-06 NOTE — SIGNIFICANT NOTE
PATIENT HAS SPOUSE WHO IS ABLE TO HELP WITH AFTERCARE. GOAL IS TO BE ABLE TO WALK BETTER.  PATIENT NEEDS DME. PT TO HAVE OUTPATIENT THERAPY.

## 2023-12-06 NOTE — DISCHARGE INSTRUCTIONS
IMPORTANT INSTRUCTIONS - PRE-ADMISSION TESTING  DO NOT EAT OR CHEW anything after midnight the night before your procedure.    You may have CLEAR liquids up to __3_ hours prior to ARRIVAL time.   Take the following medications the morning of your procedure with JUST A SIP OF WATER:  Lexapro, clartin-d, allopurinol, atorvastatin. __________  DO NOT BRING your medications to the hospital with you, UNLESS something has changed since your PRE-Admission Testing appointment.  Hold all vitamins, supplements, and NSAIDS (Non- steroidal anti-inflammatory meds) for one week prior to surgery (you MAY take Tylenol or Acetaminophen).  If you are diabetic, check your blood sugar the morning of your procedure. If it is less than 70 or if you are feeling symptomatic, call the following number for further instructions: [phone]__.  Use your inhalers/nebulizers as usual, the morning of your procedure. BRING YOUR INHALERS with you.   Bring your CPAP or BIPAP to hospital, ONLY IF YOU WILL BE SPENDING THE NIGHT.   Make sure you have a ride home and have someone who will stay with you the day of your procedure after you go home.  If you have any questions, please call your Pre-Admission Testing NurseEDUARDO  at [phone]_.   Per anesthesia request, do not smoke for 24 hours before your procedure or as instructed by your surgeon.    Clear Liquid Diet        Find out when you need to start a clear liquid diet.   Think of “clear liquids” as anything you could read a newspaper through. This includes things like water, broth, sports drinks, or tea WITHOUT any kind of milk or cream.           Once you are told to start a clear liquid diet, only drink these things until 3 hours before arrival to the hospital or when the hospital says to stop. Total volume limitation: 8 oz.       Clear liquids you CAN drink:   Water   Clear broth: beef, chicken, vegetable, or bone broth with nothing in it   Gatorade   Lemonade or [place]-aid   Soda   Tea,  coffee (NO cream or honey)   Jell-O (without fruit)   Popsicles (without fruit or cream)   Italian ices   Juice without pulp: apple, white, grape   You may use salt, pepper, and sugar    Do NOT drink:   Milk or cream   Soy milk, almond milk, coconut milk, or other non-dairy drinks and   creamers   Milkshakes or smoothies   Tomato juice   Orange juice   Grapefruit juice   Cream soups or any other than broth         Clear Liquid Diet:  Do NOT eat any solid food.  Do NOT eat or suck on mints or candy.  Do NOT chew gum.  Do NOT drink thick liquids like milk or juice with pulp in it.  Do NOT add milk, cream, or anything like soy milk or almond milk to coffee or tea.     PREOPERATIVE (BEFORE SURGERY)              BATHING INSTRUCTIONS  Instructions:    You will need to shower  3 separate times utilizing the soap provided; at the times indicated   below:     12/13/23 PM   12/14/23 AM  12/14/23 PM      Wash your hair and face with normal shampoo and soap, rinse it well before using the surgical soap.      In the shower, wet the skin completely with water from your neck to your feet. Apply the cleanser to your   body ONLY FROM THE NECK TO YOUR FEET.     Do NOT USE THE CLEANSER ON YOUR FACE, HEAD, OR GENITAL (PRIVATE) AREAS.   Keep it out of your eyes, ears, and mouth because of the risk of injury to those areas.      Scrub with a clean washcloth for each bath utilizing the soap provided from the top of your body to the   bottom starting at the neck area.      Pay close attention to your armpits, groin area, and the site of surgery.      Wash your body gently for 5 minutes. Stand outside the stream or turn off the water while scrubbing your   body. Do NOT wash with your regular soap after the surgical cleanser is used.      RINSE THE CLEANSER OFF COMPLETELY with plenty of water. Rinse the area again thoroughly.      Dry off with a clean towel. The surgical soap can cause dryness; however do NOT APPLY LOTION,   CREAM, POWDER,  and/or DEODORANT AFTER SHOWERING.     Be sure to where clean clothes after showering.      Ensure CLEAN BED LINENS AFTER FIRST wash with the surgical soap.      NO PETS ALLOWED IN THE BED with you after utilizing the surgical soap.

## 2023-12-08 ENCOUNTER — ANESTHESIA EVENT (OUTPATIENT)
Dept: PERIOP | Facility: HOSPITAL | Age: 63
End: 2023-12-08
Payer: COMMERCIAL

## 2023-12-13 NOTE — H&P
Chief Complaint  No chief complaint on file.    Joan Church presents to [facility] for follow-up of right knee pain and osteoarthritis.  He has managed this conservatively with intermittent steroid injections in the past.  Unfortunately insurance has denied viscosupplementation.  He presents today independently ambulatory without use of assistive device.  States that his knee becomes progressively painful throughout the day. Reports he is ready to proceed with total knee arthroplasty.      Objective   No Known Allergies    Vital Signs:   There were no vitals taken for this visit.      Physical Exam    Constitutional: Awake, alert. Well nourished appearance.    Integumentary: Warm, dry, intact. No obvious rashes.    HENT: Atraumatic, normocephalic.   Respiratory: Non labored respirations .   Cardiovascular: Intact peripheral pulses.    Psychiatric: Normal mood and affect. A&O X3    Ortho Exam  Right knee: Skin is warm, dry, and intact.  Tenderness to palpation of joint lines.  Crepitus with ROM.  Full knee extension and flexion 125 degrees.  Full plantarflexion dorsiflexion of the ankle.  Distal neurovascular intact.  Smooth sit to stand transition.  Patient full weightbearing with nonantalgic gait.    Imaging Results (Most Recent)       None                      Assessment and Plan   Problem List Items Addressed This Visit    None        Follow Up   No follow-ups on file.    Tobacco Use: Low Risk  (12/6/2023)    Patient History     Smoking Tobacco Use: Never     Smokeless Tobacco Use: Never     Passive Exposure: Not on file     Patient is a non-smoker.  Did not discuss tobacco cessation options.    Patient Instructions   X-rays taken, reviewed, and results discussed with patient.  Did discuss options including physical therapy, home exercise program, trial of NSAIDs, steroid injections, viscosupplementation, or surgery.  Unfortunately, insurance has denied viscosupplementation.   He has had minimal and temporary relief with steroid injections in the past.  He is ready to proceed with total knee arthroplasty.  Discussed with Dr. Jack who is in agreement.  Orders placed.    Discussed surgery. Risks/benefits discussed with patient including, but not limited to: infection, bleeding, neurovascular damage, malunion, nonunion, aesthetic deformity, need for further surgery, and death. Discussed with patient the implant type being used during surgery and patient understands and desires to proceed. Surgery pamphlet provided to patient. Call or return if worsening symptoms.    Electronically signed by Jacqueline Jack MD, 12/13/23, 2:57 PM EST.

## 2023-12-15 ENCOUNTER — ANESTHESIA EVENT CONVERTED (OUTPATIENT)
Dept: ANESTHESIOLOGY | Facility: HOSPITAL | Age: 63
End: 2023-12-15
Payer: COMMERCIAL

## 2023-12-15 ENCOUNTER — HOSPITAL ENCOUNTER (OUTPATIENT)
Facility: HOSPITAL | Age: 63
Discharge: HOME | End: 2023-12-16
Attending: ORTHOPAEDIC SURGERY | Admitting: ORTHOPAEDIC SURGERY
Payer: COMMERCIAL

## 2023-12-15 ENCOUNTER — ANESTHESIA (OUTPATIENT)
Dept: PERIOP | Facility: HOSPITAL | Age: 63
End: 2023-12-15
Payer: COMMERCIAL

## 2023-12-15 ENCOUNTER — APPOINTMENT (OUTPATIENT)
Dept: GENERAL RADIOLOGY | Facility: HOSPITAL | Age: 63
End: 2023-12-15
Payer: COMMERCIAL

## 2023-12-15 DIAGNOSIS — Z78.9 IMPAIRED MOBILITY AND ADLS: ICD-10-CM

## 2023-12-15 DIAGNOSIS — Z74.09 IMPAIRED MOBILITY AND ADLS: ICD-10-CM

## 2023-12-15 DIAGNOSIS — R26.2 DIFFICULTY IN WALKING: Primary | ICD-10-CM

## 2023-12-15 PROBLEM — Z96.659 S/P TKR (TOTAL KNEE REPLACEMENT): Status: ACTIVE | Noted: 2023-12-15

## 2023-12-15 PROBLEM — I10 HYPERTENSION: Status: ACTIVE | Noted: 2023-12-15

## 2023-12-15 PROCEDURE — 25010000002 FENTANYL CITRATE (PF) 50 MCG/ML SOLUTION: Performed by: NURSE ANESTHETIST, CERTIFIED REGISTERED

## 2023-12-15 PROCEDURE — 25010000002 ROPIVACAINE PER 1 MG: Performed by: ORTHOPAEDIC SURGERY

## 2023-12-15 PROCEDURE — 94761 N-INVAS EAR/PLS OXIMETRY MLT: CPT

## 2023-12-15 PROCEDURE — 25810000003 LACTATED RINGERS PER 1000 ML: Performed by: ANESTHESIOLOGY

## 2023-12-15 PROCEDURE — 25010000002 CEFAZOLIN IN DEXTROSE 2-4 GM/100ML-% SOLUTION: Performed by: ORTHOPAEDIC SURGERY

## 2023-12-15 PROCEDURE — 97161 PT EVAL LOW COMPLEX 20 MIN: CPT

## 2023-12-15 PROCEDURE — 25010000002 ONDANSETRON PER 1 MG: Performed by: NURSE ANESTHETIST, CERTIFIED REGISTERED

## 2023-12-15 PROCEDURE — C1776 JOINT DEVICE (IMPLANTABLE): HCPCS | Performed by: ORTHOPAEDIC SURGERY

## 2023-12-15 PROCEDURE — 94799 UNLISTED PULMONARY SVC/PX: CPT

## 2023-12-15 PROCEDURE — 25010000002 MORPHINE PER 10 MG: Performed by: ORTHOPAEDIC SURGERY

## 2023-12-15 PROCEDURE — 25010000002 DEXAMETHASONE PER 1 MG: Performed by: NURSE ANESTHETIST, CERTIFIED REGISTERED

## 2023-12-15 PROCEDURE — 25010000002 MIDAZOLAM PER 1MG: Performed by: ANESTHESIOLOGY

## 2023-12-15 PROCEDURE — 73560 X-RAY EXAM OF KNEE 1 OR 2: CPT

## 2023-12-15 PROCEDURE — C1713 ANCHOR/SCREW BN/BN,TIS/BN: HCPCS | Performed by: ORTHOPAEDIC SURGERY

## 2023-12-15 PROCEDURE — 25010000002 KETOROLAC TROMETHAMINE PER 15 MG: Performed by: ORTHOPAEDIC SURGERY

## 2023-12-15 PROCEDURE — 25010000002 CEFAZOLIN IN DEXTROSE 2000 MG/ 100 ML SOLUTION: Performed by: PHYSICIAN ASSISTANT

## 2023-12-15 PROCEDURE — 25810000003 LACTATED RINGERS PER 1000 ML: Performed by: ORTHOPAEDIC SURGERY

## 2023-12-15 PROCEDURE — 25010000002 EPINEPHRINE 1 MG/ML SOLUTION: Performed by: ORTHOPAEDIC SURGERY

## 2023-12-15 PROCEDURE — 25010000002 PROPOFOL 10 MG/ML EMULSION: Performed by: NURSE ANESTHETIST, CERTIFIED REGISTERED

## 2023-12-15 DEVICE — TRY TIB CRUC 79MM: Type: IMPLANTABLE DEVICE | Site: KNEE | Status: FUNCTIONAL

## 2023-12-15 DEVICE — PAT 3PEG STD 8X31MM: Type: IMPLANTABLE DEVICE | Site: KNEE | Status: FUNCTIONAL

## 2023-12-15 DEVICE — COMP FEM/KN VANGUARD INTLK CR 72.5MM NS RT: Type: IMPLANTABLE DEVICE | Site: KNEE | Status: FUNCTIONAL

## 2023-12-15 DEVICE — BEAR TIB/KN VANGUARD AS 12X79MM NS: Type: IMPLANTABLE DEVICE | Site: KNEE | Status: FUNCTIONAL

## 2023-12-15 DEVICE — CAP TOTL KN CMT PRIMARY: Type: IMPLANTABLE DEVICE | Site: KNEE | Status: FUNCTIONAL

## 2023-12-15 DEVICE — CMT BONE PALACOS R HI/VISC 1X40: Type: IMPLANTABLE DEVICE | Site: KNEE | Status: FUNCTIONAL

## 2023-12-15 RX ORDER — TRANEXAMIC ACID 10 MG/ML
1000 INJECTION, SOLUTION INTRAVENOUS ONCE
Status: COMPLETED | OUTPATIENT
Start: 2023-12-15 | End: 2023-12-15

## 2023-12-15 RX ORDER — PROMETHAZINE HYDROCHLORIDE 25 MG/1
25 SUPPOSITORY RECTAL ONCE AS NEEDED
Status: DISCONTINUED | OUTPATIENT
Start: 2023-12-15 | End: 2023-12-15 | Stop reason: HOSPADM

## 2023-12-15 RX ORDER — ENOXAPARIN SODIUM 100 MG/ML
40 INJECTION SUBCUTANEOUS DAILY
Status: DISCONTINUED | OUTPATIENT
Start: 2023-12-16 | End: 2023-12-16 | Stop reason: HOSPADM

## 2023-12-15 RX ORDER — NALOXONE HCL 0.4 MG/ML
0.4 VIAL (ML) INJECTION
Status: DISCONTINUED | OUTPATIENT
Start: 2023-12-15 | End: 2023-12-16 | Stop reason: HOSPADM

## 2023-12-15 RX ORDER — ALBUTEROL SULFATE 2.5 MG/3ML
SOLUTION RESPIRATORY (INHALATION) AS NEEDED
Status: DISCONTINUED | OUTPATIENT
Start: 2023-12-15 | End: 2023-12-15 | Stop reason: SURG

## 2023-12-15 RX ORDER — FERROUS SULFATE 325(65) MG
325 TABLET ORAL
Status: DISCONTINUED | OUTPATIENT
Start: 2023-12-16 | End: 2023-12-16 | Stop reason: HOSPADM

## 2023-12-15 RX ORDER — PROPOFOL 10 MG/ML
VIAL (ML) INTRAVENOUS AS NEEDED
Status: DISCONTINUED | OUTPATIENT
Start: 2023-12-15 | End: 2023-12-15 | Stop reason: SURG

## 2023-12-15 RX ORDER — FAMOTIDINE 20 MG/1
40 TABLET, FILM COATED ORAL DAILY
Status: DISCONTINUED | OUTPATIENT
Start: 2023-12-15 | End: 2023-12-16 | Stop reason: HOSPADM

## 2023-12-15 RX ORDER — AMOXICILLIN 250 MG
2 CAPSULE ORAL 2 TIMES DAILY
Status: DISCONTINUED | OUTPATIENT
Start: 2023-12-15 | End: 2023-12-16 | Stop reason: HOSPADM

## 2023-12-15 RX ORDER — ONDANSETRON 2 MG/ML
4 INJECTION INTRAMUSCULAR; INTRAVENOUS EVERY 6 HOURS PRN
Status: DISCONTINUED | OUTPATIENT
Start: 2023-12-15 | End: 2023-12-16 | Stop reason: HOSPADM

## 2023-12-15 RX ORDER — PROMETHAZINE HYDROCHLORIDE 12.5 MG/1
25 TABLET ORAL ONCE AS NEEDED
Status: DISCONTINUED | OUTPATIENT
Start: 2023-12-15 | End: 2023-12-15 | Stop reason: HOSPADM

## 2023-12-15 RX ORDER — CEFAZOLIN SODIUM 2 G/100ML
2 INJECTION, SOLUTION INTRAVENOUS ONCE
Status: COMPLETED | OUTPATIENT
Start: 2023-12-15 | End: 2023-12-15

## 2023-12-15 RX ORDER — MAGNESIUM HYDROXIDE 1200 MG/15ML
LIQUID ORAL AS NEEDED
Status: DISCONTINUED | OUTPATIENT
Start: 2023-12-15 | End: 2023-12-15 | Stop reason: HOSPADM

## 2023-12-15 RX ORDER — SODIUM CHLORIDE 9 MG/ML
40 INJECTION, SOLUTION INTRAVENOUS AS NEEDED
Status: DISCONTINUED | OUTPATIENT
Start: 2023-12-15 | End: 2023-12-15 | Stop reason: HOSPADM

## 2023-12-15 RX ORDER — KETOROLAC TROMETHAMINE 15 MG/ML
15 INJECTION, SOLUTION INTRAMUSCULAR; INTRAVENOUS EVERY 6 HOURS SCHEDULED
Status: COMPLETED | OUTPATIENT
Start: 2023-12-15 | End: 2023-12-16

## 2023-12-15 RX ORDER — ONDANSETRON 2 MG/ML
4 INJECTION INTRAMUSCULAR; INTRAVENOUS ONCE AS NEEDED
Status: DISCONTINUED | OUTPATIENT
Start: 2023-12-15 | End: 2023-12-15 | Stop reason: HOSPADM

## 2023-12-15 RX ORDER — FENTANYL CITRATE 50 UG/ML
INJECTION, SOLUTION INTRAMUSCULAR; INTRAVENOUS AS NEEDED
Status: DISCONTINUED | OUTPATIENT
Start: 2023-12-15 | End: 2023-12-15 | Stop reason: SURG

## 2023-12-15 RX ORDER — SODIUM CHLORIDE 0.9 % (FLUSH) 0.9 %
10 SYRINGE (ML) INJECTION AS NEEDED
Status: DISCONTINUED | OUTPATIENT
Start: 2023-12-15 | End: 2023-12-15 | Stop reason: HOSPADM

## 2023-12-15 RX ORDER — CELECOXIB 100 MG/1
200 CAPSULE ORAL ONCE
Status: COMPLETED | OUTPATIENT
Start: 2023-12-15 | End: 2023-12-15

## 2023-12-15 RX ORDER — ACETAMINOPHEN 500 MG
1000 TABLET ORAL ONCE
Status: COMPLETED | OUTPATIENT
Start: 2023-12-15 | End: 2023-12-15

## 2023-12-15 RX ORDER — CEFAZOLIN SODIUM 2 G/100ML
2000 INJECTION, SOLUTION INTRAVENOUS EVERY 8 HOURS
Qty: 200 ML | Refills: 0 | Status: COMPLETED | OUTPATIENT
Start: 2023-12-15 | End: 2023-12-16

## 2023-12-15 RX ORDER — ROCURONIUM BROMIDE 10 MG/ML
INJECTION, SOLUTION INTRAVENOUS AS NEEDED
Status: DISCONTINUED | OUTPATIENT
Start: 2023-12-15 | End: 2023-12-15 | Stop reason: SURG

## 2023-12-15 RX ORDER — MEPERIDINE HYDROCHLORIDE 25 MG/ML
12.5 INJECTION INTRAMUSCULAR; INTRAVENOUS; SUBCUTANEOUS
Status: DISCONTINUED | OUTPATIENT
Start: 2023-12-15 | End: 2023-12-15 | Stop reason: HOSPADM

## 2023-12-15 RX ORDER — MIDAZOLAM HYDROCHLORIDE 2 MG/2ML
2 INJECTION, SOLUTION INTRAMUSCULAR; INTRAVENOUS ONCE
Status: COMPLETED | OUTPATIENT
Start: 2023-12-15 | End: 2023-12-15

## 2023-12-15 RX ORDER — SODIUM CHLORIDE, SODIUM LACTATE, POTASSIUM CHLORIDE, CALCIUM CHLORIDE 600; 310; 30; 20 MG/100ML; MG/100ML; MG/100ML; MG/100ML
9 INJECTION, SOLUTION INTRAVENOUS CONTINUOUS PRN
Status: DISCONTINUED | OUTPATIENT
Start: 2023-12-15 | End: 2023-12-16 | Stop reason: HOSPADM

## 2023-12-15 RX ORDER — HYDROCODONE BITARTRATE AND ACETAMINOPHEN 7.5; 325 MG/1; MG/1
1 TABLET ORAL EVERY 4 HOURS PRN
Status: DISCONTINUED | OUTPATIENT
Start: 2023-12-15 | End: 2023-12-16 | Stop reason: HOSPADM

## 2023-12-15 RX ORDER — CEFAZOLIN SODIUM IN 0.9 % NACL 3 G/100 ML
3 INTRAVENOUS SOLUTION, PIGGYBACK (ML) INTRAVENOUS ONCE
Status: DISCONTINUED | OUTPATIENT
Start: 2023-12-15 | End: 2023-12-15

## 2023-12-15 RX ORDER — ONDANSETRON 2 MG/ML
INJECTION INTRAMUSCULAR; INTRAVENOUS AS NEEDED
Status: DISCONTINUED | OUTPATIENT
Start: 2023-12-15 | End: 2023-12-15 | Stop reason: SURG

## 2023-12-15 RX ORDER — GLYCOPYRROLATE 0.2 MG/ML
INJECTION INTRAMUSCULAR; INTRAVENOUS AS NEEDED
Status: DISCONTINUED | OUTPATIENT
Start: 2023-12-15 | End: 2023-12-15 | Stop reason: SURG

## 2023-12-15 RX ORDER — DEXAMETHASONE SODIUM PHOSPHATE 4 MG/ML
INJECTION, SOLUTION INTRA-ARTICULAR; INTRALESIONAL; INTRAMUSCULAR; INTRAVENOUS; SOFT TISSUE AS NEEDED
Status: DISCONTINUED | OUTPATIENT
Start: 2023-12-15 | End: 2023-12-15 | Stop reason: SURG

## 2023-12-15 RX ORDER — SODIUM CHLORIDE, SODIUM LACTATE, POTASSIUM CHLORIDE, CALCIUM CHLORIDE 600; 310; 30; 20 MG/100ML; MG/100ML; MG/100ML; MG/100ML
100 INJECTION, SOLUTION INTRAVENOUS CONTINUOUS
Status: DISCONTINUED | OUTPATIENT
Start: 2023-12-15 | End: 2023-12-16

## 2023-12-15 RX ORDER — ACETAMINOPHEN 500 MG
1000 TABLET ORAL EVERY 8 HOURS
Status: DISCONTINUED | OUTPATIENT
Start: 2023-12-15 | End: 2023-12-16 | Stop reason: HOSPADM

## 2023-12-15 RX ORDER — SUCCINYLCHOLINE/SOD CL,ISO/PF 100 MG/5ML
SYRINGE (ML) INTRAVENOUS AS NEEDED
Status: DISCONTINUED | OUTPATIENT
Start: 2023-12-15 | End: 2023-12-15 | Stop reason: SURG

## 2023-12-15 RX ORDER — ONDANSETRON 4 MG/1
4 TABLET, FILM COATED ORAL EVERY 6 HOURS PRN
Status: DISCONTINUED | OUTPATIENT
Start: 2023-12-15 | End: 2023-12-16 | Stop reason: HOSPADM

## 2023-12-15 RX ORDER — OXYCODONE HYDROCHLORIDE 5 MG/1
5 TABLET ORAL
Status: DISCONTINUED | OUTPATIENT
Start: 2023-12-15 | End: 2023-12-15 | Stop reason: HOSPADM

## 2023-12-15 RX ORDER — BUPIVACAINE HYDROCHLORIDE AND EPINEPHRINE 5; 5 MG/ML; UG/ML
INJECTION, SOLUTION EPIDURAL; INTRACAUDAL; PERINEURAL
Status: COMPLETED | OUTPATIENT
Start: 2023-12-15 | End: 2023-12-15

## 2023-12-15 RX ORDER — DEXMEDETOMIDINE HYDROCHLORIDE 100 UG/ML
INJECTION, SOLUTION INTRAVENOUS AS NEEDED
Status: DISCONTINUED | OUTPATIENT
Start: 2023-12-15 | End: 2023-12-15 | Stop reason: SURG

## 2023-12-15 RX ORDER — HYDROCODONE BITARTRATE AND ACETAMINOPHEN 7.5; 325 MG/1; MG/1
2 TABLET ORAL EVERY 4 HOURS PRN
Status: DISCONTINUED | OUTPATIENT
Start: 2023-12-15 | End: 2023-12-16 | Stop reason: HOSPADM

## 2023-12-15 RX ADMIN — BUPIVACAINE HYDROCHLORIDE AND EPINEPHRINE BITARTRATE 30 ML: 5; .005 INJECTION, SOLUTION EPIDURAL; INTRACAUDAL; PERINEURAL at 09:47

## 2023-12-15 RX ADMIN — KETOROLAC TROMETHAMINE 15 MG: 15 INJECTION, SOLUTION INTRAMUSCULAR; INTRAVENOUS at 14:28

## 2023-12-15 RX ADMIN — ACETAMINOPHEN 1000 MG: 500 TABLET ORAL at 14:28

## 2023-12-15 RX ADMIN — DEXMEDETOMIDINE 5 MCG: 100 INJECTION, SOLUTION, CONCENTRATE INTRAVENOUS at 11:37

## 2023-12-15 RX ADMIN — CELECOXIB 200 MG: 100 CAPSULE ORAL at 09:06

## 2023-12-15 RX ADMIN — CEFAZOLIN SODIUM 2000 MG: 2 INJECTION, SOLUTION INTRAVENOUS at 18:21

## 2023-12-15 RX ADMIN — SODIUM CHLORIDE, POTASSIUM CHLORIDE, SODIUM LACTATE AND CALCIUM CHLORIDE: 600; 310; 30; 20 INJECTION, SOLUTION INTRAVENOUS at 11:45

## 2023-12-15 RX ADMIN — ACETAMINOPHEN 1000 MG: 500 TABLET ORAL at 23:36

## 2023-12-15 RX ADMIN — DEXMEDETOMIDINE 5 MCG: 100 INJECTION, SOLUTION, CONCENTRATE INTRAVENOUS at 12:09

## 2023-12-15 RX ADMIN — DEXMEDETOMIDINE 10 MCG: 100 INJECTION, SOLUTION, CONCENTRATE INTRAVENOUS at 12:20

## 2023-12-15 RX ADMIN — DEXMEDETOMIDINE 10 MCG: 100 INJECTION, SOLUTION, CONCENTRATE INTRAVENOUS at 11:46

## 2023-12-15 RX ADMIN — ACETAMINOPHEN 1000 MG: 500 TABLET ORAL at 09:06

## 2023-12-15 RX ADMIN — ROCURONIUM BROMIDE 10 MG: 50 INJECTION INTRAVENOUS at 10:55

## 2023-12-15 RX ADMIN — KETOROLAC TROMETHAMINE 15 MG: 15 INJECTION, SOLUTION INTRAMUSCULAR; INTRAVENOUS at 23:36

## 2023-12-15 RX ADMIN — PROPOFOL 200 MG: 10 INJECTION, EMULSION INTRAVENOUS at 10:55

## 2023-12-15 RX ADMIN — DEXAMETHASONE SODIUM PHOSPHATE 4 MG: 4 INJECTION, SOLUTION INTRAMUSCULAR; INTRAVENOUS at 11:28

## 2023-12-15 RX ADMIN — SODIUM CHLORIDE, POTASSIUM CHLORIDE, SODIUM LACTATE AND CALCIUM CHLORIDE 100 ML/HR: 600; 310; 30; 20 INJECTION, SOLUTION INTRAVENOUS at 14:30

## 2023-12-15 RX ADMIN — KETOROLAC TROMETHAMINE 15 MG: 15 INJECTION, SOLUTION INTRAMUSCULAR; INTRAVENOUS at 18:21

## 2023-12-15 RX ADMIN — ALBUTEROL SULFATE 2.5 MG: 2.5 SOLUTION RESPIRATORY (INHALATION) at 11:13

## 2023-12-15 RX ADMIN — ONDANSETRON 4 MG: 2 INJECTION INTRAMUSCULAR; INTRAVENOUS at 11:28

## 2023-12-15 RX ADMIN — SODIUM CHLORIDE, POTASSIUM CHLORIDE, SODIUM LACTATE AND CALCIUM CHLORIDE 9 ML/HR: 600; 310; 30; 20 INJECTION, SOLUTION INTRAVENOUS at 09:01

## 2023-12-15 RX ADMIN — TRANEXAMIC ACID 1000 MG: 10 INJECTION, SOLUTION INTRAVENOUS at 09:25

## 2023-12-15 RX ADMIN — Medication 100 MG: at 10:55

## 2023-12-15 RX ADMIN — GLYCOPYRROLATE 0.2 MG: 0.2 INJECTION INTRAMUSCULAR; INTRAVENOUS at 11:27

## 2023-12-15 RX ADMIN — FENTANYL CITRATE 50 MCG: 50 INJECTION, SOLUTION INTRAMUSCULAR; INTRAVENOUS at 10:55

## 2023-12-15 RX ADMIN — DEXMEDETOMIDINE 10 MCG: 100 INJECTION, SOLUTION, CONCENTRATE INTRAVENOUS at 12:28

## 2023-12-15 RX ADMIN — FENTANYL CITRATE 50 MCG: 50 INJECTION, SOLUTION INTRAMUSCULAR; INTRAVENOUS at 10:50

## 2023-12-15 RX ADMIN — MIDAZOLAM HYDROCHLORIDE 2 MG: 1 INJECTION, SOLUTION INTRAMUSCULAR; INTRAVENOUS at 09:24

## 2023-12-15 RX ADMIN — ROCURONIUM BROMIDE 15 MG: 50 INJECTION INTRAVENOUS at 11:42

## 2023-12-15 RX ADMIN — ROCURONIUM BROMIDE 40 MG: 50 INJECTION INTRAVENOUS at 11:00

## 2023-12-15 RX ADMIN — CEFAZOLIN SODIUM 2 G: 2 INJECTION, SOLUTION INTRAVENOUS at 10:49

## 2023-12-15 RX ADMIN — DEXMEDETOMIDINE 10 MCG: 100 INJECTION, SOLUTION, CONCENTRATE INTRAVENOUS at 12:06

## 2023-12-15 RX ADMIN — TRANEXAMIC ACID 1000 MG: 10 INJECTION, SOLUTION INTRAVENOUS at 11:58

## 2023-12-15 NOTE — OP NOTE
TOTAL KNEE ARTHROPLASTY  Procedure Report    Patient Name:  Agus Church  YOB: 1960    Date of Surgery:  12/15/2023     Indications:  Severe OA, failed conservative treatment    Pre-op Diagnosis:   Primary osteoarthritis of right knee [M17.11]       Post-Op Diagnosis Codes:     * Primary osteoarthritis of right knee [M17.11]    Procedure/CPT® Codes:      Procedure(s):  RIGHT TOTAL KNEE ARTHROPLASTY.    Surgical Approach: Knee Medial Parapatellar        Staff:  Surgeon(s):  Jacqueline Jack MD    Assistant: Laurei Mock; Shreya Xiong RN CSA    Anesthesia: General with Block    Estimated Blood Loss:  75    Implants:    Implant Name Type Inv. Item Serial No.  Lot No. LRB No. Used Action   CMT BONE PALACOS R HI/VISC 1X40 - [id number] Implant CMT BONE PALACOS R HI/VISC 1X40  [facility] [id number] [id number] Right 2 Implanted   COMP FEM/KN VANGUARD INTLK CR 72.5MM NS RT - [id number] Implant COMP FEM/KN VANGUARD INTLK CR 72.5MM NS RT  [employer] [id number] Right 1 Implanted   TRY TIB CRUC 79MM - [id number] Implant TRY TIB CRUC 79MM  [employer] [id number] Right 1 Implanted   PAT 3PEG STD 8X31MM - [id number] Implant PAT 3PEG STD 8X31MM  [employer] [id number] Right 1 Implanted   BEAR TIB/KN VANGUARD AS 98H36YO NS - [id number] Implant BEAR TIB/KN VANGUARD AS [id number] NS  [employer] [id number] Right 1 Implanted       Specimen:          None        Findings: advance OA    Complications: None    Description of Procedure: The patient was brought to the operating room and underwent general anesthesia, had a preoperative antibiotic, and was then prepped and draped in sterile fashion. An Esmarch was used to exsanguinate the limb. The tourniquet was then inflated. A standard medial parapatellar arthrotomy was performed. An intramedullary guide was placed in the femur. A distal femoral cut was made and measured. The 4-in-1 block was placed. Excellent cuts were achieved. Bone was removed, followed by  removal of the ACL and remaining menisci. The intramedullary guide was placed in the tibia. The tibia was then cut and measured. This was then broached. The patella was then osteotomized, removing approximately 8-10 mm of bone. It measured. There was excellent range of motion, tracking and stability of the trials. The trials were removed. Bony cut surfaces were thoroughly irrigated. The knee was then injected. The cement was vacuum mixed, placed on the undersurface of the components and then packed into place. Excess cement was removed. Once this had hardened, trial polys were tried and the appropriate sized anterior insert was then chosen. This was then locked, thoroughly irrigated. Bovie electrocautery was used for hemostasis. The tourniquet was deflated. This was again thoroughly irrigated and closed using #1 Vicryl, 2-0 Vicryl and staples. A sterile dressing was applied. The patient was then extubated and taken to the recovery room in stable condition. There were no complications.    Assistant: Laurie Mock; Shreya Xiong RN CSA  was responsible for performing the following activities: Retraction, Suction, Irrigation, Closing, and Placing Dressing and their skilled assistance was necessary for the success of this case.    Jacqueline Jack MD     Date: 12/15/2023  Time: 12:32 EST

## 2023-12-15 NOTE — ANESTHESIA POSTPROCEDURE EVALUATION
Patient: Agus Church    Procedure Summary       Date: 12/15/23 Room / Location: [facility] OR 01 / [facility] MAIN OR    Anesthesia Start: 1048 Anesthesia Stop: 1229    Procedure: RIGHT TOTAL KNEE ARTHROPLASTY. (Right: Knee) Diagnosis:       Primary osteoarthritis of right knee      (Primary osteoarthritis of right knee [M17.11])    Surgeons: Jacqueline Jack MD Provider: Rojas Stein MD    Anesthesia Type: general with block ASA Status: 2            Anesthesia Type: general with block    Vitals  Vitals Value Taken Time   /76 12/15/23 1318   Temp 36.7 °C (98 °F) 12/15/23 1315   Pulse 72 12/15/23 1319   Resp 16 12/15/23 1315   SpO2 97 % 12/15/23 1319   Vitals shown include unfiled device data.        Post Anesthesia Care and Evaluation    Patient location during evaluation: bedside  Patient participation: complete - patient participated  Level of consciousness: awake  Pain management: adequate    Airway patency: patent  PONV Status: none  Cardiovascular status: acceptable and stable  Respiratory status: acceptable  Hydration status: acceptable    Comments: An Anesthesiologist personally participated in the most demanding procedures (including induction and emergence if applicable) in the anesthesia plan, monitored the course of anesthesia administration at frequent intervals and remained physically present and available for immediate diagnosis and treatment of emergencies.

## 2023-12-15 NOTE — THERAPY EVALUATION
Acute Care - Physical Therapy Initial Evaluation   [facility]     Patient Name: Agus Church  : 1960  MRN: [id number]  Today's Date: 12/15/2023      Admit date: 12/15/2023     Referring Physician: Saul Magallanes MD     Surgery Date:12/15/2023   Procedure(s) (LRB):  RIGHT TOTAL KNEE ARTHROPLASTY. (Right)         Visit Dx:     ICD-10-CM ICD-9-CM   1. Difficulty in walking  R26.2 719.7     Patient Active Problem List   Diagnosis    History of colon polyps    Primary osteoarthritis of right knee    S/P TKR (total knee replacement)     Past Medical History:   Diagnosis Date    Anxiety     Colon polyp     Depression     Detached retina     Gout     Hypercholesterolemia     Hypertension      Past Surgical History:   Procedure Laterality Date    COLONOSCOPY N/A     COLONOSCOPY N/A 10/25/2023    Procedure: COLONOSCOPY WITH COLD SNARE POLYPECTOMY;  Surgeon: Anthony Berman MD;  Location: [facility] ENDOSCOPY;  Service: Gastroenterology;  Laterality: N/A;  COLON POLYP    CYST REMOVAL      leg    HERNIA REPAIR      x2    RETINAL DETACHMENT REPAIR      TONSILLECTOMY      VASECTOMY       PT Assessment (last 12 hours)       PT Evaluation and Treatment       Row Name 12/15/23 1339          Physical Therapy Time and Intention    Subjective Information no complaints  -WOJCIECH     Document Type evaluation  -WOJCIECH     Mode of Treatment individual therapy;physical therapy  -WOJCIECH     Patient Effort good  -WOJCIECH     Symptoms Noted During/After Treatment none  -WOJCIECH       Row Name 12/15/23 6719          General Information    Patient Observations alert;cooperative;agree to therapy  -WOJCIECH     Prior Level of Function independent:;all household mobility;community mobility  -WOJCIECH     Equipment Currently Used at Home none  -WOJCIECH     Existing Precautions/Restrictions fall;weight bearing  -WOJCIECH     Barriers to Rehab none identified  -WOJCIECH       Row Name 12/15/23 2087          Living Environment    Current Living Arrangements home  -WOJCIECH     People in Home spouse   -WOJCIECH       Row Name 12/15/23 1339          Cognition    Orientation Status (Cognition) oriented x 3  -WOJCIECH       Row Name 12/15/23 1339          Range of Motion Comprehensive    General Range of Motion lower extremity range of motion deficits identified  R knee 5-90°  -WOJCIECH       Row Name 12/15/23 1339          Strength Comprehensive (MMT)    General Manual Muscle Testing (MMT) Assessment lower extremity strength deficits identified  RLE 4-/5  -WOJCIECH       Row Name 12/15/23 1339          Bed Mobility    Bed Mobility bed mobility (all) activities;supine-sit  -WOJCIECH     All Activities, [place] (Bed Mobility) contact guard  -WOJCIECH     Supine-Sit [place] (Bed Mobility) contact guard  -WOJCIECH       Row Name 12/15/23 1339          Transfers    Transfers bed-chair transfer;sit-stand transfer  -WOJCIECH       Row Name 12/15/23 1339          Bed-Chair Transfer    Bed-Chair [place] (Transfers) contact guard  -WOJCIECH     Assistive Device (Bed-Chair Transfers) walker, front-wheeled  -WOJCIECH       Row Name 12/15/23 1339          Sit-Stand Transfer    Sit-Stand [place] (Transfers) contact guard  -WOJCIECH     Assistive Device (Sit-Stand Transfers) walker, front-wheeled  -WOJCIECH       Row Name 12/15/23 1339          Gait/Stairs (Locomotion)    Gait/Stairs Locomotion gait/ambulation assistive device  -WOJCIECH     [place] Level (Gait) contact guard  -WOJCIECH     Assistive Device (Gait) walker, front-wheeled  -WOJCIECH     Patient was able to Ambulate yes  -WOJCIECH     Distance in Feet (Gait) 25  -WOJCIECH     Pattern (Gait) step-to  -WOJCIECH       Row Name 12/15/23 1339          Safety Issues, Functional Mobility    Impairments Affecting Function (Mobility) balance;pain;range of motion (ROM);strength  -WOJCIECH       Row Name 12/15/23 1339          Balance    Balance Assessment standing dynamic balance  -WOJCIECH     Dynamic Standing Balance contact guard  -WOJCIECH     Position/Device Used, Standing Balance walker, front-wheeled  -WOJCIECH       Row Name             Wound 12/15/23 1118 Right anterior  knee Incision    Wound - Properties Group Placement Date: 12/15/23  -SC Placement Time: 1118  -SC Present on Original Admission: N  -SC Side: Right  -SC Orientation: anterior  -SC Location: knee  -SC Primary Wound Type: Incision  -SC    Retired Wound - Properties Group Placement Date: 12/15/23  -SC Placement Time: 1118  -SC Present on Original Admission: N  -SC Side: Right  -SC Orientation: anterior  -SC Location: knee  -SC Primary Wound Type: Incision  -SC    Retired Wound - Properties Group Date first assessed: 12/15/23  -SC Time first assessed: 1118  -SC Present on Original Admission: N  -SC Side: Right  -SC Location: knee  -SC Primary Wound Type: Incision  -SC      Row Name 12/15/23 7490          Plan of Care Review    Plan of Care Reviewed With patient  -WOJCIECH     Outcome Evaluation Pt presents with decreased ROM, strength, transfers and ambulation.  Skilled PT services will be required to address these mobility deficits.  -WOJCIECH       Row Name 12/15/23 1336          Therapy Assessment/Plan (PT)    Patient/Family Therapy Goals Statement (PT) Pt wants to walk without pain  -WOJCIECH     Rehab Potential (PT) good, to achieve stated therapy goals  -WOJCIECH     Criteria for Skilled Interventions Met (PT) skilled treatment is necessary  -WOJCIECH     Therapy Frequency (PT) 2 times/day  -WOJCIECH     Predicted Duration of Therapy Intervention (PT) 10 days  -WOJCIECH     Problem List (PT) problems related to;balance;mobility;range of motion (ROM);strength;pain  -WOJCIECH     Activity Limitations Related to Problem List (PT) unable to ambulate safely;unable to transfer safely  -WOJCIECH       Row Name 12/15/23 5125          PT Evaluation Complexity    History, PT Evaluation Complexity no personal factors and/or comorbidities  -WOJCIECH     Examination of Body Systems (PT Eval Complexity) total of 4 or more elements  -WOJCIECH     Clinical Presentation (PT Evaluation Complexity) stable  -WOJCIECH     Clinical Decision Making (PT Evaluation Complexity) low complexity  -WOJCIECH     Overall  Complexity (PT Evaluation Complexity) low complexity  -WOJCIECH       Row Name 12/15/23 1339          Therapy Plan Review/Discharge Plan (PT)    Therapy Plan Review (PT) evaluation/treatment results reviewed;participants voiced agreement with care plan;participants included;patient  -WOJCIECH       Row Name 12/15/23 1339          Physical Therapy Goals    Transfer Goal Selection (PT) transfer, PT goal 1  -WOJCIECH     Gait Training Goal Selection (PT) gait training, PT goal 1  -WOJCIECH     ROM Goal Selection (PT) ROM, PT goal 1  -WOJCIECH     Strength Goal Selection (PT) strength, PT goal 1  -WOJCIECH       Row Name 12/15/23 1339          Transfer Goal 1 (PT)    Activity/Assistive Device (Transfer Goal 1, PT) transfers, all  -WOJCIECH     [place] Level/Cues Needed (Transfer Goal 1, PT) independent  -WOJCIECH     Time Frame (Transfer Goal 1, PT) long term goal (LTG);10 days  -WOJCIECH       Row Name 12/15/23 1339          Gait Training Goal 1 (PT)    Activity/Assistive Device (Gait Training Goal 1, PT) gait (walking locomotion);walker, rolling  -WOJCIECH     [place] Level (Gait Training Goal 1, PT) independent  -WOJCIECH     Distance (Gait Training Goal 1, PT) 300  -WOJCIECH     Time Frame (Gait Training Goal 1, PT) long term goal (LTG);10 days  -WOJCIECH       Row Name 12/15/23 1339          ROM Goal 1 (PT)    ROM Goal 1 (PT) Pt will demonstrate knee ROM from 0-110° on the affected side.  -WOJCIECH     Time Frame (ROM Goal 1, PT) long-term goal (LTG);10 days  -WOJCIECH       Row Name 12/15/23 1339          Strength Goal 1 (PT)    Strength Goal 1 (PT) Pt will demonstrate knee extension strength of 5/5 on the affected side.  -WOJCIECH     Time Frame (Strength Goal 1, PT) long term goal (LTG);10 days  -WOJCIECH               User Key  (r) = Recorded By, (t) = Taken By, (c) = Cosigned By      Initials Name Provider Type    Tracy Tyler, RN Registered Nurse    Ghanshyam Lewis, PT Physical Therapist                    Physical Therapy Education       Title: PT OT SLP Therapies (Done)       Topic:  Physical Therapy (Done)       Point: Mobility training (Done)       Learning Progress Summary             Patient Acceptance, E,TB, VU by WOJCIECH at 12/15/2023 1346                         Point: Precautions (Done)       Learning Progress Summary             Patient Acceptance, E,TB, VU by WOJCIECH at 12/15/2023 1346                                         User Key       Initials Effective Dates Name Provider Type Discipline    WOJCIECH 06/03/21 -  Ghanshyam Tucker, PT Physical Therapist PT                  PT Recommendation and Plan  Anticipated Discharge Disposition (PT): home with outpatient therapy services  Planned Therapy Interventions (PT): balance training, bed mobility training, gait training, home exercise program, stair training, ROM (range of motion), strengthening, stretching, transfer training  Therapy Frequency (PT): 2 times/day  Plan of Care Reviewed With: patient  Outcome Evaluation: Pt presents with decreased ROM, strength, transfers and ambulation.  Skilled PT services will be required to address these mobility deficits.   Outcome Measures       Row Name 12/15/23 1346 12/15/23 1339          How much help from another person do you currently need...    Turning from your back to your side while in flat bed without using bedrails? 3  -WOJCIECH --     Moving from lying on back to sitting on the side of a flat bed without bedrails? 3  -WOJCIECH --     Moving to and from a bed to a chair (including a wheelchair)? 3  -WOJCIECH --     Standing up from a chair using your arms (e.g., wheelchair, bedside chair)? 3  -WOJCIECH --     Climbing 3-5 steps with a railing? 3  -WOJCIECH --     To walk in hospital room? 3  -WOJCIECH --     AM-PAC 6 Clicks Score (PT) 18  -WOJCIECH --     Highest Level of Mobility Goal 6 --> Walk 10 steps or more  -WOJCIECH --        Functional Assessment    Outcome Measure Options -- AM-PAC 6 Clicks Basic Mobility (PT)  -WOJCIECH               User Key  (r) = Recorded By, (t) = Taken By, (c) = Cosigned By      Initials Name Provider Type    WOJCIECH Tucker  Ghanshyam OCASIO, PT Physical Therapist                     Time Calculation:    PT Charges       Row Name 12/15/23 1344             Time Calculation    PT Received On 12/15/23  -WOJCIECH      PT Goal Re-Cert Due Date 12/24/23  -WOJCIECH         Untimed Charges    PT Eval/Re-eval Minutes 30  -WOJCIECH         Total Minutes    Untimed Charges Total Minutes 30  -WOJCIECH       Total Minutes 30  -WOJCIECH                User Key  (r) = Recorded By, (t) = Taken By, (c) = Cosigned By      Initials Name Provider Type    Ghanshyam Lewis, PT Physical Therapist                  Therapy Charges for Today       Code Description Service Date Service Provider Modifiers Qty    70646886066 HC PT EVAL LOW COMPLEXITY 3 12/15/2023 Ghanshyam Tucker, PT GP 1            PT G-Codes  Outcome Measure Options: AM-PAC 6 Clicks Basic Mobility (PT)  AM-PAC 6 Clicks Score (PT): 18    Ghanshyam Tucker PT  12/15/2023

## 2023-12-15 NOTE — PROGRESS NOTES
[facility]     Progress Note    Patient Name: Agus Church  : 1960  MRN: [id number]  Primary Care Physician:  Maria Elena Jimenez MD  Date of admission: 12/15/2023      Subjective   Brief summary.  Right knee pain      HPI:  Patient 63-year-old male with known history of hypertension, hyperlipidemia and chronic allergies admitted to hospital for elective knee replacement.  Patient is postop, awake alert and oriented no complaints of nausea, no chest pain or shortness of breath    Review of Systems     No fever chills no chest pain, no shortness of breath no abdominal pain, no nausea knee pain and numbness      Objective     Vitals:   Temp:  [97.5 °F (36.4 °C)-98.2 °F (36.8 °C)] 97.9 °F (36.6 °C)  Heart Rate:  [54-82] 76  Resp:  [10-20] 16  BP: ()/(48-84) 111/68  Flow (L/min):  [2-6] 2    Physical Exam :     Elderly male not in acute distress, looks younger than stated age.  Heart regular.  Lungs clear.  Abdomen soft nontender.  Extremities no edema.  Right lower extremity with surgical dressing and peripheral pulses intact      Result Review:  I have personally reviewed the results from the time of this admission to 12/15/2023 17:13 EST and agree with these findings:  [x]  Laboratory  []  Microbiology  []  Radiology  []  EKG/Telemetry   []  Cardiology/Vascular   []  Pathology  []  Old records  []  Other:           Assessment / Plan       Active Hospital Problems:  Active Hospital Problems    Diagnosis     **Primary osteoarthritis of right knee     S/P TKR (total knee replacement)     Hypertension        Plan:   Stable postop, patient postelective surgery.  No immediate intra or postop complications.  Resume essential home meds.  DVT and GI prophylaxis per protocol.  Perioperative antibiotic per protocol.  PT OT in a.m..  Discharge home tomorrow if remains stable       DVT prophylaxis:  Medical and mechanical DVT prophylaxis orders are present.    CODE STATUS:              Electronically signed by  Saul Magallanes MD, 12/15/23, 5:11 PM EST.

## 2023-12-15 NOTE — ANESTHESIA PREPROCEDURE EVALUATION
Anesthesia Evaluation     Patient summary reviewed and Nursing notes reviewed   no history of anesthetic complications:   NPO Solid Status: > 8 hours  NPO Liquid Status: > 2 hours           Airway   Mallampati: II  TM distance: >3 FB  Neck ROM: full  No difficulty expected  Dental      Pulmonary - negative pulmonary ROS and normal exam    breath sounds clear to auscultation  Cardiovascular - normal exam  Exercise tolerance: good (4-7 METS)    ECG reviewed  Rhythm: regular  Rate: normal    (+) hypertension, hyperlipidemia      Neuro/Psych- negative ROS  GI/Hepatic/Renal/Endo    (+) obesity    Musculoskeletal     Abdominal    Substance History      OB/GYN          Other   arthritis,     ROS/Med Hx Other: >4METS, ACTIVE. HX HTN,HLD. TJR. KT               Anesthesia Plan    ASA 2     general with block   total IV anesthesia  (Patient understands anesthesia not responsible for dental damage. Regional anesthesia options discussed with patient. Pt accepts regional block.)  intravenous induction     Anesthetic plan, risks, benefits, and alternatives have been provided, discussed and informed consent has been obtained with: patient and spouse/significant other.    CODE STATUS:

## 2023-12-15 NOTE — ANESTHESIA PROCEDURE NOTES
Peripheral Block      Patient reassessed immediately prior to procedure    Patient location during procedure: pre-op  Start time: 12/15/2023 9:47 AM  Stop time: 12/15/2023 9:50 AM  Reason for block: at surgeon's request and post-op pain management  Performed by  Anesthesiologist: Rojas Stein MD  Preanesthetic Checklist  Completed: patient identified, IV checked, site marked, risks and benefits discussed, surgical consent, monitors and equipment checked, pre-op evaluation and timeout performed  Prep:  Pt Position: supine  Sterile barriers:alcohol skin prep, partial drape, cap, washed/disinfected hands, mask and gloves  Prep: ChloraPrep  Patient monitoring: blood pressure monitoring, continuous pulse oximetry and EKG  Procedure    Sedation: yes  Performed under: local infiltration  Guidance:ultrasound guided and nerve stimulator    ULTRASOUND INTERPRETATION.  Using ultrasound guidance a 20 G gauge needle was placed in close proximity to the nerve, at which point, under ultrasound guidance anesthetic was injected in the area of the nerve and spread of the anesthesia was seen on ultrasound in close proximity thereto.  There were no abnormalities seen on ultrasound; a digital image was taken; and the patient tolerated the procedure with no complications. Images:still images obtained, printed/placed on chart    Laterality:right  Block Type:adductor canal block  Injection Technique:single-shot  Needle Type:echogenic  Needle Gauge:20 G (4in)  Resistance on Injection: none    Medications Used: bupivacaine-EPINEPHrine PF (MARCAINE w/EPI) 0.5% -[id number] injection - Injection   30 mL - 12/15/2023 9:47:00 AM      Post Assessment  Injection Assessment: negative aspiration for heme, no paresthesia on injection and incremental injection  Patient Tolerance:comfortable throughout block  Complications:no  Additional Notes  The block or continuous infusion is requested by the referring physician for management of postoperative  pain, or pain related to a procedure. Ultrasound guidance (deemed medically necessary). Painless injection, pt was awake and conversant during the procedure without complications. Needle and surrounding structures visualized throughout procedure. No adverse reactions or complications seen during this period. Post-procedure image showed no signs of complication, and anatomy was consistent with an uncomplicated nerve blockade.

## 2023-12-16 VITALS
SYSTOLIC BLOOD PRESSURE: 106 MMHG | DIASTOLIC BLOOD PRESSURE: 62 MMHG | BODY MASS INDEX: 33.5 KG/M2 | HEART RATE: 57 BPM | HEIGHT: 72 IN | OXYGEN SATURATION: 98 % | WEIGHT: 247.36 LBS | TEMPERATURE: 98.1 F | RESPIRATION RATE: 18 BRPM

## 2023-12-16 PROBLEM — Z96.659 S/P TKR (TOTAL KNEE REPLACEMENT): Status: RESOLVED | Noted: 2023-12-15 | Resolved: 2023-12-16

## 2023-12-16 LAB
ANION GAP SERPL CALCULATED.3IONS-SCNC: 11.6 MMOL/L (ref 5–15)
BUN SERPL-MCNC: 12 MG/DL (ref 8–23)
BUN/CREAT SERPL: 11.1 (ref 7–25)
CALCIUM SPEC-SCNC: 9.1 MG/DL (ref 8.6–10.5)
CHLORIDE SERPL-SCNC: 101 MMOL/L (ref 98–107)
CO2 SERPL-SCNC: 22.4 MMOL/L (ref 22–29)
CREAT SERPL-MCNC: 1.08 MG/DL (ref 0.76–1.27)
EGFRCR SERPLBLD CKD-EPI 2021: 76.6 ML/MIN/1.73
GLUCOSE SERPL-MCNC: 151 MG/DL (ref 65–99)
HCT VFR BLD AUTO: 36.2 % (ref 37.5–51)
HGB BLD-MCNC: 12.4 G/DL (ref 13–17.7)
POTASSIUM SERPL-SCNC: 4.2 MMOL/L (ref 3.5–5.2)
SODIUM SERPL-SCNC: 135 MMOL/L (ref 136–145)

## 2023-12-16 PROCEDURE — 85014 HEMATOCRIT: CPT | Performed by: ORTHOPAEDIC SURGERY

## 2023-12-16 PROCEDURE — 97110 THERAPEUTIC EXERCISES: CPT

## 2023-12-16 PROCEDURE — 94799 UNLISTED PULMONARY SVC/PX: CPT

## 2023-12-16 PROCEDURE — 25010000002 ENOXAPARIN PER 10 MG: Performed by: ORTHOPAEDIC SURGERY

## 2023-12-16 PROCEDURE — 25010000002 KETOROLAC TROMETHAMINE PER 15 MG: Performed by: ORTHOPAEDIC SURGERY

## 2023-12-16 PROCEDURE — 97530 THERAPEUTIC ACTIVITIES: CPT

## 2023-12-16 PROCEDURE — 85018 HEMOGLOBIN: CPT | Performed by: ORTHOPAEDIC SURGERY

## 2023-12-16 PROCEDURE — 25010000002 CEFAZOLIN IN DEXTROSE 2-4 GM/100ML-% SOLUTION: Performed by: ORTHOPAEDIC SURGERY

## 2023-12-16 PROCEDURE — 97535 SELF CARE MNGMENT TRAINING: CPT

## 2023-12-16 PROCEDURE — 97116 GAIT TRAINING THERAPY: CPT

## 2023-12-16 PROCEDURE — 80048 BASIC METABOLIC PNL TOTAL CA: CPT | Performed by: ORTHOPAEDIC SURGERY

## 2023-12-16 PROCEDURE — 97165 OT EVAL LOW COMPLEX 30 MIN: CPT

## 2023-12-16 RX ORDER — HYDROCODONE BITARTRATE AND ACETAMINOPHEN 7.5; 325 MG/1; MG/1
1-2 TABLET ORAL EVERY 4 HOURS PRN
Qty: 40 TABLET | Refills: 0 | Status: SHIPPED | OUTPATIENT
Start: 2023-12-16 | End: 2023-12-20 | Stop reason: SDUPTHER

## 2023-12-16 RX ADMIN — ACETAMINOPHEN 1000 MG: 500 TABLET ORAL at 06:23

## 2023-12-16 RX ADMIN — FAMOTIDINE 40 MG: 20 TABLET, FILM COATED ORAL at 08:13

## 2023-12-16 RX ADMIN — DOCUSATE SODIUM 50 MG AND SENNOSIDES 8.6 MG 2 TABLET: 8.6; 5 TABLET, FILM COATED ORAL at 08:13

## 2023-12-16 RX ADMIN — KETOROLAC TROMETHAMINE 15 MG: 15 INJECTION, SOLUTION INTRAMUSCULAR; INTRAVENOUS at 05:51

## 2023-12-16 RX ADMIN — ENOXAPARIN SODIUM 40 MG: 100 INJECTION SUBCUTANEOUS at 08:13

## 2023-12-16 RX ADMIN — CEFAZOLIN SODIUM 2000 MG: 2 INJECTION, SOLUTION INTRAVENOUS at 02:27

## 2023-12-16 RX ADMIN — FERROUS SULFATE TAB 325 MG (65 MG ELEMENTAL FE) 325 MG: 325 (65 FE) TAB at 08:13

## 2023-12-16 NOTE — THERAPY EVALUATION
Patient Name: Agus PURI Ranjit  : 1960    MRN: [id number]                              Today's Date: 2023       Admit Date: 12/15/2023    Visit Dx:     ICD-10-CM ICD-9-CM   1. Difficulty in walking  R26.2 719.7   2. Impaired mobility and ADLs  Z74.09 V49.89    Z78.9      Patient Active Problem List   Diagnosis    History of colon polyps    Primary osteoarthritis of right knee    S/P TKR (total knee replacement)    Hypertension     Past Medical History:   Diagnosis Date    Anxiety     Colon polyp     Depression     Detached retina     Gout     Hypercholesterolemia     Hypertension 12/15/2023     Past Surgical History:   Procedure Laterality Date    COLONOSCOPY N/A     COLONOSCOPY N/A 10/25/2023    Procedure: COLONOSCOPY WITH COLD SNARE POLYPECTOMY;  Surgeon: Anthony Berman MD;  Location: [facility] ENDOSCOPY;  Service: Gastroenterology;  Laterality: N/A;  COLON POLYP    CYST REMOVAL      leg    HERNIA REPAIR      x2    RETINAL DETACHMENT REPAIR      TONSILLECTOMY      TOTAL KNEE ARTHROPLASTY Right 12/15/2023    Procedure: RIGHT TOTAL KNEE ARTHROPLASTY.;  Surgeon: Jacqueline Jack MD;  Location: [facility] MAIN OR;  Service: Orthopedics;  Laterality: Right;    VASECTOMY        General Information       Row Name 23 0916 23       OT Time and Intention    Document Type therapy note (daily note)  -AC evaluation  -AC    Mode of Treatment individual therapy;occupational therapy  -AC individual therapy;occupational therapy  -AC      Row Name 23 0916 23       General Information    Patient Profile Reviewed yes  -AC yes  -AC    Prior Level of Function -- --  Patient reports independent with ADLs with use of tub shower combination without seat and has a raised toilet in place without grab bars.  -AC    Existing Precautions/Restrictions fall;weight bearing  -AC fall;weight bearing  -AC    Barriers to Rehab -- none identified  -AC      Row Name 23          Occupational  Profile    Reason for Services/Referral (Occupational Profile) Pt. is a 63year old male admitted for the above diagnosis status post right total knee replacement on 12/15/2023. Pt. referred to OT services to assess independence with ADLs and adl transfers/fx'l mobility. No previous OT services for current condition.  -       Row Name 12/16/23 0906          Living Environment    People in Home spouse  -       Row Name 12/16/23 0916 12/16/23 0906       Cognition    Orientation Status (Cognition) oriented x 3  -AC oriented x 3  -AC      Row Name 12/16/23 0916 12/16/23 0906       Safety Issues, Functional Mobility    Impairments Affecting Function (Mobility) balance;pain;endurance/activity tolerance  - balance;pain;endurance/activity tolerance  -              User Key  (r) = Recorded By, (t) = Taken By, (c) = Cosigned By      Initials Name Provider Type    AC Scarlett John, NIDIA Occupational Therapist                     Mobility/ADL's       Row Name 12/16/23 0916          Bed Mobility    Comment, (Bed Mobility) patient sitting in recliner upon OT entering the room.  -       Row Name 12/16/23 0916          Transfers    Transfers sit-stand transfer  -       Row Name 12/16/23 0916 12/16/23 0909       Sit-Stand Transfer    Sit-Stand [place] (Transfers) 1 person assist;contact guard;verbal cues  - 1 person assist;contact guard  -    Assistive Device (Sit-Stand Transfers) walker, front-wheeled  -AC walker, front-wheeled  -AC    Comment, (Sit-Stand Transfer) patient was CGA with rolling walker for sit to/from stand x 4 and cues for safety and technique.  - --      Row Name 12/16/23 0916 12/16/23 0909       Functional Mobility    Functional Mobility- Ind. Level contact guard assist;1 person;verbal cues required  - contact guard assist;1 person  -    Functional Mobility- Device walker, front-wheeled  -AC walker, front-wheeled  -AC    Functional Mobility- Comment Patient was CGA with rolling walker for  recliner to/from sink for grooming with cues for safety and technique. x1 knee buckle while standing at the sink.  -AC --      Row Name 12/16/23 0916 12/16/23 0909       Activities of Daily Living    BADL Assessment/Intervention upper body dressing;lower body dressing;grooming  -AC --  Patient is set up for self-feeding, set up for grooming, set up for upper body bathing/dressing, contact-guard assist for lower body bathing/dressing, contact-guard assist for toileting.  -AC      Row Name 12/16/23 0916 12/16/23 0909       Mobility    Extremity Weight-bearing Status right lower extremity  -AC right lower extremity  -AC    Right Lower Extremity (Weight-bearing Status) weight-bearing as tolerated (WBAT)  -AC weight-bearing as tolerated (WBAT)  -AC      Row Name 12/16/23 0916          Upper Body Dressing Assessment/Training    [place] Level (Upper Body Dressing) upper body dressing skills;doff;don;pull-over garment;set up  -AC     Position (Upper Body Dressing) unsupported sitting  -AC       Row Name 12/16/23 0916          Lower Body Dressing Assessment/Training    [place] Level (Lower Body Dressing) lower body dressing skills;doff;don;pants/bottoms;shoes/slippers;socks;verbal cues;contact guard assist  -AC     Position (Lower Body Dressing) supported standing;unsupported sitting  -AC       Row Name 12/16/23 0916          Grooming Assessment/Training    [place] Level (Grooming) grooming skills;hair care, combing/brushing;oral care regimen;verbal cues;contact guard assist  -AC     Position (Grooming) sink side;supported standing  -AC               User Key  (r) = Recorded By, (t) = Taken By, (c) = Cosigned By      Initials Name Provider Type    AC Scarlett John OT Occupational Therapist                   Obj/Interventions       Row Name 12/16/23 0921 12/16/23 0910       Sensory Assessment (Somatosensory)    Sensory Assessment (Somatosensory) UE sensation intact  - UE sensation intact  -      Row Name  12/16/23 0921 12/16/23 0910       Vision Assessment/Intervention    Visual Impairment/Limitations WFL  -AC WFL  -AC      Row Name 12/16/23 0921 12/16/23 0910       Range of Motion Comprehensive    General Range of Motion bilateral upper extremity ROM WNL  -AC bilateral upper extremity ROM WNL  -AC      Row Name 12/16/23 0921 12/16/23 0910       Strength Comprehensive (MMT)    General Manual Muscle Testing (MMT) Assessment no strength deficits identified  -AC no strength deficits identified  -AC      Row Name 12/16/23 0921 12/16/23 0910       Motor Skills    Motor Skills coordination;functional endurance  -AC coordination;functional endurance  -AC    Coordination WFL  -AC WFL  -AC    Functional Endurance f+  -AC fair plus  -AC      Row Name 12/16/23 0921 12/16/23 0910       Balance    Balance Assessment standing dynamic balance  -AC standing dynamic balance  -AC    Dynamic Standing Balance contact guard;1-person assist;verbal cues  -AC contact guard;1-person assist  -AC    Position/Device Used, Standing Balance supported;walker, front-wheeled  -AC supported;walker, front-wheeled  -AC    Balance Interventions standing;sit to stand;supported;minimal challenge;dynamic;occupation based/functional task  -AC --              User Key  (r) = Recorded By, (t) = Taken By, (c) = Cosigned By      Initials Name Provider Type    AC Scarlett John OT Occupational Therapist                   Goals/Plan       Row Name 12/16/23 0913          Transfer Goal 1 (OT)    Activity/Assistive Device (Transfer Goal 1, OT) transfers, all  -AC     [place] Level/Cues Needed (Transfer Goal 1, OT) modified independence  -AC     Time Frame (Transfer Goal 1, OT) long term goal (LTG);10 days  -AC       Row Name 12/16/23 0913          Bathing Goal 1 (OT)    Activity/Device (Bathing Goal 1, OT) bathing skills, all  -AC     [place] Level/Cues Needed (Bathing Goal 1, OT) modified independence  -AC     Time Frame (Bathing Goal 1, OT) long term  goal (LTG);10 days  -AC       Row Name 12/16/23 0913          Dressing Goal 1 (OT)    Activity/Device (Dressing Goal 1, OT) dressing skills, all  -AC     [place]/Cues Needed (Dressing Goal 1, OT) modified independence  -AC     Time Frame (Dressing Goal 1, OT) long term goal (LTG);10 days  -AC       Row Name 12/16/23 0913          Toileting Goal 1 (OT)    Activity/Device (Toileting Goal 1, OT) toileting skills, all  -AC     [place] Level/Cues Needed (Toileting Goal 1, OT) modified independence  -AC     Time Frame (Toileting Goal 1, OT) long term goal (LTG);10 days  -AC       Row Name 12/16/23 0913          Grooming Goal 1 (OT)    Activity/Device (Grooming Goal 1, OT) grooming skills, all  -AC     [place] (Grooming Goal 1, OT) modified independence  -AC     Time Frame (Grooming Goal 1, OT) long term goal (LTG);10 days  -AC       Row Name 12/16/23 0913          Problem Specific Goal 1 (OT)    Problem Specific Goal 1 (OT) patient will demonstrate good activity tolerance for adls.  -AC     Time Frame (Problem Specific Goal 1, OT) long term goal (LTG);10 days  -AC       Row Name 12/16/23 0913          Therapy Assessment/Plan (OT)    Planned Therapy Interventions (OT) activity tolerance training;functional balance retraining;occupation/activity based interventions;ROM/therapeutic exercise;transfer/mobility retraining;patient/caregiver education/training;BADL retraining  -AC               User Key  (r) = Recorded By, (t) = Taken By, (c) = Cosigned By      Initials Name Provider Type    AC Scarlett John, OT Occupational Therapist                   Clinical Impression       Row Name 12/16/23 0922 12/16/23 0911       Pain Assessment    Pretreatment Pain Rating 0/10 - no pain  -AC 0/10 - no pain  -AC    Posttreatment Pain Rating 0/10 - no pain  -AC 0/10 - no pain  -AC      Row Name 12/16/23 0922 12/16/23 0911       Plan of Care Review    Plan of Care Reviewed With patient  -AC patient  -AC    Progress improving   -AC no change  -    Outcome Evaluation patient instructed in lower body adaptive dressing technique, weightbear status, joint protection and safety with adl transfers.  Patient to continue with therapy services in order to maximize safety and independence with ADLs.  - Patient presents with balance and endurance limitations that impede his/her ability to perform ADLS. The skills of a therapist are necessary to maximize independence with ADLs.  -      Row Name 12/16/23 0911          Therapy Assessment/Plan (OT)    Patient/Family Therapy Goal Statement (OT) Walk without pain.  -AC     Rehab Potential (OT) good, to achieve stated therapy goals  -     Criteria for Skilled Therapeutic Interventions Met (OT) yes;meets criteria;skilled treatment is necessary  -     Therapy Frequency (OT) 5 times/wk  -       Row Name 12/16/23 0911          Therapy Plan Review/Discharge Plan (OT)    Equipment Needs Upon Discharge (OT) walker, rolling;commode chair  -AC     Anticipated Discharge Disposition (OT) home with assist;home with outpatient therapy services  -       Row Name 12/16/23 0911          Positioning and Restraints    Pre-Treatment Position sitting in chair/recliner  -     Post Treatment Position chair  -AC     In Chair reclined;legs elevated;call light within reach;encouraged to call for assist;exit alarm on  -               User Key  (r) = Recorded By, (t) = Taken By, (c) = Cosigned By      Initials Name Provider Type    AC Scarlett John, NIDIA Occupational Therapist                   Outcome Measures       Row Name 12/16/23 0914          How much help from another is currently needed...    Putting on and taking off regular lower body clothing? 3  -AC     Bathing (including washing, rinsing, and drying) 3  -AC     Toileting (which includes using toilet bed pan or urinal) 3  -AC     Putting on and taking off regular upper body clothing 4  -AC     Taking care of personal grooming (such as brushing teeth) 4   -AC     Eating meals 4  -AC     AM-PAC 6 Clicks Score (OT) 21  -AC       Row Name 12/16/23 0858 12/16/23 0701       How much help from another person do you currently need...    Turning from your back to your side while in flat bed without using bedrails? 4  -TS 4  -MH    Moving from lying on back to sitting on the side of a flat bed without bedrails? 4  -TS 4  -MH    Moving to and from a bed to a chair (including a wheelchair)? 3  -TS 3  -MH    Standing up from a chair using your arms (e.g., wheelchair, bedside chair)? 4  -TS 3  -MH    Climbing 3-5 steps with a railing? 3  -TS 3  -MH    To walk in hospital room? 3  -TS 3  -MH    AM-PAC 6 Clicks Score (PT) 21  -TS 20  -MH    Highest Level of Mobility Goal 6 --> Walk 10 steps or more  -TS 6 --> Walk 10 steps or more  -MH      Row Name 12/16/23 0914          Functional Assessment    Outcome Measure Options AM-PAC 6 Clicks Daily Activity (OT);Optimal Instrument  -AC       Row Name 12/16/23 0914          Optimal Instrument    Optimal Instrument Optimal - 3  -AC     Bending/Stooping 2  -AC     Standing 2  -AC     Reaching 1  -AC     From the list, choose the 3 activities you would most like to be able to do without any difficulty Bending/stooping;Standing;Reaching  -AC     Total Score Optimal - 3 5  -AC               User Key  (r) = Recorded By, (t) = Taken By, (c) = Cosigned By      Initials Name Provider Type    Evangelista Mera PTA Physical Therapist Assistant    Jennifer Steward, RN Registered Nurse    Scarlett Klein, NIDIA Occupational Therapist                      OT Recommendation and Plan  Planned Therapy Interventions (OT): activity tolerance training, functional balance retraining, occupation/activity based interventions, ROM/therapeutic exercise, transfer/mobility retraining, patient/caregiver education/training, BADL retraining  Therapy Frequency (OT): 5 times/wk  Plan of Care Review  Plan of Care Reviewed With: patient  Progress: improving  Outcome  Evaluation: patient instructed in lower body adaptive dressing technique, weightbear status, joint protection and safety with adl transfers.  Patient to continue with therapy services in order to maximize safety and independence with ADLs.     Time Calculation:   Evaluation Complexity (OT)  Review Occupational Profile/Medical/Therapy History Complexity: expanded/moderate complexity  Assessment, Occupational Performance/Identification of Deficit Complexity: 1-3 performance deficits  Clinical Decision Making Complexity (OT): problem focused assessment/low complexity  Overall Complexity of Evaluation (OT): low complexity     Time Calculation- OT       Row Name 12/16/23 0915 12/16/23 0850          Time Calculation- OT    OT Received On 12/16/23  -AC --     OT Goal Re-Cert Due Date 12/25/23  -AC --        Timed Charges    03163 - Gait Training Minutes  -- 10  -TS     86708 - OT Therapeutic Activity Minutes 8  -AC --     79158 - OT Self Care/Mgmt Minutes 15  -AC --        Untimed Charges    OT Eval/Re-eval Minutes 32  -AC --        Total Minutes    Timed Charges Total Minutes 23  -AC 10  -TS     Untimed Charges Total Minutes 32  -AC --      Total Minutes 55  -AC 10  -TS               User Key  (r) = Recorded By, (t) = Taken By, (c) = Cosigned By      Initials Name Provider Type    TS Evangelista Whitehead, PTA Physical Therapist Assistant    AC Scarlett John OT Occupational Therapist                  Therapy Charges for Today       Code Description Service Date Service Provider Modifiers Qty    36308077445 HC OT THERAPEUTIC ACT EA 15 MIN 12/16/2023 Scarlett John OT GO 1    40892188748 HC OT SELF CARE/MGMT/TRAIN EA 15 MIN 12/16/2023 Scarlett John OT GO 1    19542177438 HC OT EVAL LOW COMPLEXITY 3 12/16/2023 Scarlett John OT GO 1                 Scarlett John OT  12/16/2023   0 = understands/communicates without difficulty

## 2023-12-16 NOTE — PLAN OF CARE
Problem: Adult Inpatient Plan of Care  Goal: Plan of Care Review  Outcome: Met  Goal: Patient-Specific Goal (Individualized)  Outcome: Met  Goal: Absence of Hospital-Acquired Illness or Injury  Outcome: Met  Intervention: Identify and Manage Fall Risk  Recent Flowsheet Documentation  Taken 12/16/2023 1200 by Jennifer Turner RN  Safety Promotion/Fall Prevention: safety round/check completed  Taken 12/16/2023 1000 by Jennifer Turner RN  Safety Promotion/Fall Prevention: safety round/check completed  Taken 12/16/2023 0800 by Jennifer Turner RN  Safety Promotion/Fall Prevention: safety round/check completed  Taken 12/16/2023 0701 by Jennifer Turner RN  Safety Promotion/Fall Prevention: safety round/check completed  Intervention: Prevent Skin Injury  Recent Flowsheet Documentation  Taken 12/16/2023 0701 by Jennifer Turner RN  Body Position: position changed independently  Skin Protection:   adhesive use limited   tubing/devices free from skin contact  Intervention: Prevent and Manage VTE (Venous Thromboembolism) Risk  Recent Flowsheet Documentation  Taken 12/16/2023 0800 by Jennifer Turner RN  Activity Management: activity encouraged  Taken 12/16/2023 0701 by Jennifer Turner RN  Activity Management: activity encouraged  VTE Prevention/Management:   bilateral   compression stockings on  Range of Motion: ROM (range of motion) performed  Intervention: Prevent Infection  Recent Flowsheet Documentation  Taken 12/16/2023 0701 by Jennifer Turner RN  Infection Prevention:   environmental surveillance performed   hand hygiene promoted   rest/sleep promoted   single patient room provided   visitors restricted/screened  Goal: Optimal Comfort and Wellbeing  Outcome: Met  Intervention: Monitor Pain and Promote Comfort  Recent Flowsheet Documentation  Taken 12/16/2023 0701 by Jennifer Turner RN  Pain Management Interventions:   see MAR   quiet environment facilitated   care clustered   cold applied  Intervention: Provide  Person-Centered Care  Recent Flowsheet Documentation  Taken 12/16/2023 0701 by Jennifer Turner RN  Trust Relationship/Rapport:   care explained   emotional support provided   questions answered   thoughts/feelings acknowledged  Goal: Readiness for Transition of Care  Outcome: Met     Problem: Adjustment to Surgery (Knee Arthroplasty)  Goal: Optimal Coping  Outcome: Met  Intervention: Support Psychosocial Response to Surgery and Mobility Changes  Recent Flowsheet Documentation  Taken 12/16/2023 0701 by Jennifer Turner RN  Supportive Measures: active listening utilized     Problem: Bleeding (Knee Arthroplasty)  Goal: Absence of Bleeding  Outcome: Met  Intervention: Monitor and Manage Bleeding  Recent Flowsheet Documentation  Taken 12/16/2023 0701 by Jennifer Turner RN  Bleeding Management: dressing monitored     Problem: Bowel Motility Impaired (Knee Arthroplasty)  Goal: Effective Bowel Elimination  Outcome: Met  Intervention: Enhance Bowel Motility and Elimination  Recent Flowsheet Documentation  Taken 12/16/2023 0701 by Jennifer Turner RN  Bowel Elimination Management: toileting offered  Bowel Elimination Promotion:   adequate fluid intake promoted   ambulation promoted     Problem: Fluid and Electrolyte Imbalance (Knee Arthroplasty)  Goal: Fluid and Electrolyte Balance  Outcome: Met     Problem: Functional Ability Impaired (Knee Arthroplasty)  Goal: Optimal Functional Ability  Outcome: Met  Intervention: Promote Optimal Functional Status  Recent Flowsheet Documentation  Taken 12/16/2023 0800 by Jennifer Turner RN  Activity Management: activity encouraged  Assistive Device Utilized:   gait belt   walker  Taken 12/16/2023 0701 by Jennifer Turner RN  Activity Management: activity encouraged  Assistive Device Utilized:   gait belt   walker  Self-Care Promotion: independence encouraged     Problem: Infection (Knee Arthroplasty)  Goal: Absence of Infection Signs and Symptoms  Outcome: Met  Intervention: Prevent or  Manage Infection  Recent Flowsheet Documentation  Taken 12/16/2023 0701 by Jennifer Turner RN  Infection Prevention:   environmental surveillance performed   hand hygiene promoted   rest/sleep promoted   single patient room provided   visitors restricted/screened     Problem: Neurovascular Compromise (Knee Arthroplasty)  Goal: Intact Neurovascular Status  Outcome: Met  Intervention: Prevent or Manage Neurovascular Compromise  Recent Flowsheet Documentation  Taken 12/16/2023 0701 by Jennifer Turner RN  Compartment Syndrome Management: active flexion/extension encouraged  Compartment Syndrome Surveillance: no pain with passive muscle stretch     Problem: Ongoing Anesthesia Effects (Knee Arthroplasty)  Goal: Anesthesia/Sedation Recovery  Outcome: Met  Intervention: Optimize Anesthesia Recovery  Recent Flowsheet Documentation  Taken 12/16/2023 1200 by Jennifer Turner RN  Safety Promotion/Fall Prevention: safety round/check completed  Taken 12/16/2023 1132 by Jennifer Turner RN  Patient Tolerance (IS):   good   no adverse signs/symptoms present  Administration (IS): self-administered  Level Incentive Spirometer (mL): 3500  Number of Repetitions (IS): 10  Taken 12/16/2023 1000 by Jennifer Turner RN  Safety Promotion/Fall Prevention: safety round/check completed  Taken 12/16/2023 0931 by Jennifer Turner RN  Patient Tolerance (IS):   good   no adverse signs/symptoms present  Administration (IS):   self-administered   instruction provided, follow-up   proper technique demonstrated  Level Incentive Spirometer (mL): 3500  Number of Repetitions (IS): 10  Taken 12/16/2023 0800 by Jennifer Turner RN  Safety Promotion/Fall Prevention: safety round/check completed  Taken 12/16/2023 0722 by Jennifer Turner RN  Patient Tolerance (IS):   good   no adverse signs/symptoms present  Administration (IS):   self-administered   instruction provided, follow-up   proper technique demonstrated  Level Incentive Spirometer (mL):  2500  Number of Repetitions (IS): 10  Taken 12/16/2023 0701 by Jennifer Turner RN  Safety Promotion/Fall Prevention: safety round/check completed  Reorientation Measures: clock in view     Problem: Pain (Knee Arthroplasty)  Goal: Acceptable Pain Control  Outcome: Met  Intervention: Prevent or Manage Pain  Recent Flowsheet Documentation  Taken 12/16/2023 0701 by Jennifer Turner RN  Pain Management Interventions:   see MAR   quiet environment facilitated   care clustered   cold applied  Diversional Activities:   television   smartphone     Problem: Postoperative Nausea and Vomiting (Knee Arthroplasty)  Goal: Nausea and Vomiting Relief  Outcome: Met     Problem: Postoperative Urinary Retention (Knee Arthroplasty)  Goal: Effective Urinary Elimination  Outcome: Met  Intervention: Monitor and Manage Urinary Retention  Recent Flowsheet Documentation  Taken 12/16/2023 0701 by Jennifer Turner RN  Urinary Elimination Promotion: toileting offered     Problem: Respiratory Compromise (Knee Arthroplasty)  Goal: Effective Oxygenation and Ventilation  Outcome: Met  Intervention: Optimize Oxygenation and Ventilation  Recent Flowsheet Documentation  Taken 12/16/2023 1132 by Jennifer Turner RN  Patient Tolerance (IS):   good   no adverse signs/symptoms present  Administration (IS): self-administered  Level Incentive Spirometer (mL): 3500  Number of Repetitions (IS): 10  Taken 12/16/2023 0931 by Jennifer Turner RN  Patient Tolerance (IS):   good   no adverse signs/symptoms present  Administration (IS):   self-administered   instruction provided, follow-up   proper technique demonstrated  Level Incentive Spirometer (mL): 3500  Number of Repetitions (IS): 10  Taken 12/16/2023 0722 by Jennifer Turner RN  Patient Tolerance (IS):   good   no adverse signs/symptoms present  Administration (IS):   self-administered   instruction provided, follow-up   proper technique demonstrated  Level Incentive Spirometer (mL): 2500  Number of  Repetitions (IS): 10  Taken 12/16/2023 0701 by Jennifer Turner RN  Head of Bed (HOB) Positioning: HOB elevated     Problem: Hypertension Comorbidity  Goal: Blood Pressure in Desired Range  Outcome: Met  Intervention: Maintain Blood Pressure Management  Recent Flowsheet Documentation  Taken 12/16/2023 1200 by Jennifer Turner RN  Medication Review/Management: medications reviewed  Taken 12/16/2023 1000 by Jennifer Turner RN  Medication Review/Management: medications reviewed  Taken 12/16/2023 0800 by Jennifer Turner RN  Medication Review/Management: medications reviewed  Taken 12/16/2023 0701 by Jennifer Turner RN  Medication Review/Management: medications reviewed     Problem: Pain Chronic (Persistent) (Comorbidity Management)  Goal: Acceptable Pain Control and Functional Ability  Outcome: Met  Intervention: Manage Persistent Pain  Recent Flowsheet Documentation  Taken 12/16/2023 1200 by Jennifer Turner RN  Medication Review/Management: medications reviewed  Taken 12/16/2023 1000 by Jennifer Turner RN  Medication Review/Management: medications reviewed  Taken 12/16/2023 0800 by Jennifer Turner RN  Medication Review/Management: medications reviewed  Taken 12/16/2023 0701 by Jennifer Turner RN  Bowel Elimination Promotion:   adequate fluid intake promoted   ambulation promoted  Sleep/Rest Enhancement: awakenings minimized  Medication Review/Management: medications reviewed  Intervention: Develop Pain Management Plan  Recent Flowsheet Documentation  Taken 12/16/2023 0701 by Jennifer Turner RN  Pain Management Interventions:   see MAR   quiet environment facilitated   care clustered   cold applied  Intervention: Optimize Psychosocial Wellbeing  Recent Flowsheet Documentation  Taken 12/16/2023 0701 by Jennifer Turner RN  Supportive Measures: active listening utilized  Diversional Activities:   television   smartphone  Family/Support System Care:   support provided   self-care encouraged   involvement promoted    Goal Outcome Evaluation:   Pt has had no new changes throughout shift and continues to remain stable. Pt is POD1 of right TKA perofrmed by Dr. Jack. Pt has had no complaints of pain. Pt is x1-stand by assist with walker and gait belt. Pt plans to DC home this afternoon using his personal pharmacy for DC meds. Pt has no DME needs, spouse will be ride, and will be doing outpatient therapy.

## 2023-12-16 NOTE — THERAPY TREATMENT NOTE
Acute Care - Physical Therapy Treatment Note   [facility]     Patient Name: Agus Church  : 1960  MRN: [id number]  Today's Date: 2023      Visit Dx:     ICD-10-CM ICD-9-CM   1. Difficulty in walking  R26.2 719.7   2. Impaired mobility and ADLs  Z74.09 V49.89    Z78.9      Patient Active Problem List   Diagnosis    History of colon polyps    Primary osteoarthritis of right knee    S/P TKR (total knee replacement)    Hypertension     Past Medical History:   Diagnosis Date    Anxiety     Colon polyp     Depression     Detached retina     Gout     Hypercholesterolemia     Hypertension 12/15/2023     Past Surgical History:   Procedure Laterality Date    COLONOSCOPY N/A     COLONOSCOPY N/A 10/25/2023    Procedure: COLONOSCOPY WITH COLD SNARE POLYPECTOMY;  Surgeon: Anthony Berman MD;  Location: [facility] ENDOSCOPY;  Service: Gastroenterology;  Laterality: N/A;  COLON POLYP    CYST REMOVAL      leg    HERNIA REPAIR      x2    RETINAL DETACHMENT REPAIR      TONSILLECTOMY      TOTAL KNEE ARTHROPLASTY Right 12/15/2023    Procedure: RIGHT TOTAL KNEE ARTHROPLASTY.;  Surgeon: Jacqueline Jack MD;  Location: [facility] MAIN OR;  Service: Orthopedics;  Laterality: Right;    VASECTOMY       PT Assessment (last 12 hours)       PT Evaluation and Treatment       Row Name 23 1234 23 0851       Physical Therapy Time and Intention    Subjective Information complains of;pain  -TS complains of;pain  -TS    Document Type therapy note (daily note)  -TS therapy note (daily note)  -TS    Mode of Treatment individual therapy;physical therapy  -TS individual therapy;physical therapy  -TS      Row Name 23 1234 23 0851       Pain    Pretreatment Pain Rating 1/10  -TS 10  -TS    Pain Location - Side/Orientation Right  -TS Right  -TS    Pain Location - knee  -TS knee  -TS      Row Name 23 1234 23 0851       Cognition    Affect/Mental Status (Cognition) WNL  -TS WNL  -TS      Row Name 23 0851           Range of Motion (ROM)    Range of Motion right lower extremity ROM  AAROM R knee 5-90°  -TS       Row Name 12/16/23 1234 12/16/23 0851       Mobility    Extremity Weight-bearing Status right lower extremity  -TS right lower extremity  -TS    Right Lower Extremity (Weight-bearing Status) weight-bearing as tolerated (WBAT)  -TS weight-bearing as tolerated (WBAT)  -TS      Row Name 12/16/23 1234 12/16/23 0851       Transfers    Transfers sit-stand transfer;stand-sit transfer;car transfer  -TS sit-stand transfer;stand-sit transfer;car transfer  -TS      Row Name 12/16/23 1234 12/16/23 0851       Sit-Stand Transfer    Sit-Stand [place] (Transfers) standby assist;verbal cues  -TS standby assist;verbal cues  -TS    Assistive Device (Sit-Stand Transfers) walker, front-wheeled  -TS walker, front-wheeled  -TS      Row Name 12/16/23 1234 12/16/23 0851       Stand-Sit Transfer    Stand-Sit [place] (Transfers) standby assist;verbal cues  -TS standby assist;verbal cues  -TS    Assistive Device (Stand-Sit Transfers) walker, front-wheeled  -TS walker, front-wheeled  -TS      Row Name 12/16/23 1234 12/16/23 0851       Car Transfer    Type (Car Transfer) sit-stand;stand-sit  -TS sit-stand;stand-sit  -TS    [place] Level (Car Transfer) standby assist;verbal cues  -TS standby assist;verbal cues  -TS    Assistive Device (Car Transfer) walker, front-wheeled  -TS walker, front-wheeled  -TS      Row Name 12/16/23 1234 12/16/23 0851       Gait/Stairs (Locomotion)    Gait/Stairs Locomotion gait/ambulation assistive device  -TS gait/ambulation assistive device  -TS    [place] Level (Gait) standby assist;contact guard;verbal cues  -TS standby assist;contact guard;verbal cues  -TS    Assistive Device (Gait) walker, front-wheeled  -TS walker, front-wheeled  -TS    Distance in Feet (Gait) 280  -  -TS    Pattern (Gait) 3-point;step-through  -TS 3-point;step-through  -TS    Negotiation (Stairs) stairs  independence;stairs assistive device  -TS stairs independence;stairs assistive device  -TS    [place] Level (Stairs) contact guard  -TS contact guard  -TS    Handrail Location (Stairs) both sides  -TS both sides  -TS    Number of Steps (Stairs) 4  -TS 4  -TS    Ascending Technique (Stairs) step-to-step  -TS step-to-step  -TS    Descending Technique (Stairs) step-to-step  -TS step-to-step  -TS    Stairs, Impairments ROM decreased;strength decreased;impaired balance;pain  -TS ROM decreased;strength decreased;impaired balance;pain  -TS      Row Name 12/16/23 1234 12/16/23 0851       Safety Issues, Functional Mobility    Impairments Affecting Function (Mobility) balance;pain;range of motion (ROM);strength  -TS balance;pain;range of motion (ROM);strength  -TS      Row Name 12/16/23 1234 12/16/23 0851       Motor Skills    Therapeutic Exercise hip;knee;ankle  AAROM RLE x20 reps, recumbent position  -TS hip;knee;ankle  AAROM RLE x20 reps, recumbent position  -TS      Row Name             Wound 12/15/23 1118 Right anterior knee Incision    Wound - Properties Group Placement Date: 12/15/23  -SC Placement Time: 1118  -SC Present on Original Admission: N  -SC Side: Right  -SC Orientation: anterior  -SC Location: knee  -SC Primary Wound Type: Incision  -SC    Retired Wound - Properties Group Placement Date: 12/15/23  -SC Placement Time: 1118  -SC Present on Original Admission: N  -SC Side: Right  -SC Orientation: anterior  -SC Location: knee  -SC Primary Wound Type: Incision  -SC    Retired Wound - Properties Group Date first assessed: 12/15/23  -SC Time first assessed: 1118  -SC Present on Original Admission: N  -SC Side: Right  -SC Location: knee  -SC Primary Wound Type: Incision  -SC      Row Name 12/16/23 1234 12/16/23 0851       Positioning and Restraints    Pre-Treatment Position sitting in chair/recliner  -TS sitting in chair/recliner  -TS    Post Treatment Position chair  -TS chair  -TS    In Chair reclined;call  light within reach;with family/caregiver;R heel elevated;exit alarm on  -TS reclined;call light within reach;exit alarm on;heels elevated  -TS      Row Name 12/16/23 1234 12/16/23 0851       Progress Summary (PT)    Progress Toward Functional Goals (PT) progress toward functional goals is good  -TS progress toward functional goals is good  -TS              User Key  (r) = Recorded By, (t) = Taken By, (c) = Cosigned By      Initials Name Provider Type    TS Evangelista Whitehead, MAUREEN Physical Therapist Assistant    Tracy Tyler, RN Registered Nurse                Right Knee Ther-ex   Exercise  Reps  Sets    Long arc Quads   10 2   Short arc Quads  10 2   Heel Slides  10 2   Ankle Pumps  10 2   Quad sets  10 2   Glut sets  10 2   Straight leg raise  10 2          Physical Therapy Education       Title: PT OT SLP Therapies (Done)       Topic: Physical Therapy (Done)       Point: Mobility training (Done)       Learning Progress Summary             Patient Acceptance, E,D,TB, VU,DU by  at 12/16/2023 0914    Acceptance, E,TB, VU,NR by  at 12/15/2023 2114    Acceptance, E,TB, VU by WOJCIECH at 12/15/2023 1346                         Point: Precautions (Done)       Learning Progress Summary             Patient Acceptance, E,D,TB, VU,DU by  at 12/16/2023 0914    Acceptance, E,TB, VU,NR by  at 12/15/2023 2114    Acceptance, E,TB, VU by WOJCIECH at 12/15/2023 1346                                         User Key       Initials Effective Dates Name Provider Type Discipline    EV 06/16/21 -  Pamela Dubon, RN Registered Nurse Nurse    AC 06/16/21 -  Scarlett John OT Occupational Therapist OT    WOJCIECH 06/03/21 -  Ghanshyam Tucker, PT Physical Therapist PT                  PT Recommendation and Plan     Progress Summary (PT)  Progress Toward Functional Goals (PT): progress toward functional goals is good   Outcome Measures       Row Name 12/16/23 0858 12/15/23 1346 12/15/23 1339       How much help from another person do you  currently need...    Turning from your back to your side while in flat bed without using bedrails? 4  -TS 3  -WOJCIECH --    Moving from lying on back to sitting on the side of a flat bed without bedrails? 4  -TS 3  -WOJCIECH --    Moving to and from a bed to a chair (including a wheelchair)? 3  -TS 3  -WOJCIECH --    Standing up from a chair using your arms (e.g., wheelchair, bedside chair)? 4  -TS 3  -WOJCIECH --    Climbing 3-5 steps with a railing? 3  -TS 3  -WOJCIECH --    To walk in hospital room? 3  -TS 3  -WOJCIECH --    AM-PAC 6 Clicks Score (PT) 21  -TS 18  -WOJCIECH --    Highest Level of Mobility Goal 6 --> Walk 10 steps or more  -TS 6 --> Walk 10 steps or more  -WOJCIECH --       Functional Assessment    Outcome Measure Options -- -- AM-PAC 6 Clicks Basic Mobility (PT)  -WOJCIECH              User Key  (r) = Recorded By, (t) = Taken By, (c) = Cosigned By      Initials Name Provider Type    Evangelista Mera PTA Physical Therapist Assistant    Ghanshyam Lewis, PT Physical Therapist                     Time Calculation:    PT Charges       Row Name 12/16/23 1233 12/16/23 0850          Time Calculation    PT Received On -- 12/16/23  -TS     PT Goal Re-Cert Due Date -- 12/24/23  -TS        Timed Charges    12016 - PT Therapeutic Exercise Minutes 15  -TS 17  -TS     51775 - Gait Training Minutes  9  -TS 10  -TS     61243 - PT Therapeutic Activity Minutes 3  -TS 3  -TS        Total Minutes    Timed Charges Total Minutes 27  -TS 30  -TS      Total Minutes 27  -TS 30  -TS               User Key  (r) = Recorded By, (t) = Taken By, (c) = Cosigned By      Initials Name Provider Type    Evangelista Mera PTA Physical Therapist Assistant                  Therapy Charges for Today       Code Description Service Date Service Provider Modifiers Qty    35695662729 HC PT THER PROC EA 15 MIN 12/16/2023 Evangelista Whitehead PTA GP 1    06526241341 HC GAIT TRAINING EA 15 MIN 12/16/2023 Evangelista Whitehead PTA GP 1    53465950710 HC PT THER PROC EA 15 MIN 12/16/2023 [place]  MAUREEN Naidu GP 1    22487560595 HC GAIT TRAINING EA 15 MIN 12/16/2023 Evangelista Whitehead PTA GP 1            PT G-Codes  Outcome Measure Options: AM-PAC 6 Clicks Daily Activity (OT), Optimal Instrument  AM-PAC 6 Clicks Score (PT): 21  AM-PAC 6 Clicks Score (OT): 21    Evangelista Whitehead PTA  12/16/2023

## 2023-12-16 NOTE — DISCHARGE SUMMARY
[facility]        HOSPITALIST  DISCHARGE SUMMARY    Patient Name: Agus Church  : 1960  MRN: [id number]    Date of Admission: 12/15/2023  Date of Discharge:  2023  Primary Care Physician: Maria Elena Jimenez MD    Consults       Date and Time Order Name Status Description    12/15/2023  1:33 PM Inpatient Internal Medicine Consult              Active and Resolved Hospital Problems:  Active Hospital Problems    Diagnosis POA    **Primary osteoarthritis of right knee [M17.11] Yes    Hypertension [I10] Yes      Resolved Hospital Problems    Diagnosis POA    S/P TKR (total knee replacement) [Z96.659] Not Applicable   Hypertension    Hospital Course     Hospital Course:  Agus Church is a 63 y.o. male with known history of hypertension, hyperlipidemia and chronic allergies admitted to hospital for elective knee replacement.  Patient is postop, awake alert and oriented no complaints of nausea, no chest pain or shortness of breath  Patient underwent right total knee arthroplasty on December 15.    Interval follow-up;  Vital signs stable on room air  No fever chills no chest pain, no shortness of breath no abdominal pain, no nausea, knee numbness  Left knee pain tolerable.  Cleared by Ortho to be released      DISCHARGE Follow Up Recommendations for labs and diagnostics: PCP and Ortho      Day of Discharge     Vital Signs:  Temp:  [97.3 °F (36.3 °C)-98.2 °F (36.8 °C)] 98.1 °F (36.7 °C)  Heart Rate:  [57-76] 57  Resp:  [16-18] 18  BP: (102-135)/(59-77) 106/62  Flow (L/min):  [2] 2    Physical Exam:     not in acute distress, looks younger than stated age.  Heart regular.  Lungs clear.  Abdomen soft nontender.  Extremities no edema.  Right lower extremity with surgical dressing and peripheral pulses intact     Discharge Details        Discharge Medications        New Medications        Instructions Start Date   apixaban 2.5 MG tablet tablet  Commonly known as: ELIQUIS   2.5 mg, Oral, Every 12  Hours Scheduled, Once Eliquis is completed, begin enteric-coated aspirin 325 mg p.o. daily for 4 weeks      HYDROcodone-acetaminophen 7.5-325 MG per tablet  Commonly known as: Norco   1-2 tablets, Oral, Every 4 Hours PRN             Continue These Medications        Instructions Start Date   allopurinol 300 MG tablet  Commonly known as: ZYLOPRIM   300 mg, Oral, Daily      atorvastatin 10 MG tablet  Commonly known as: LIPITOR   10 mg, Oral, Daily      candesartan 16 MG tablet  Commonly known as: ATACAND   16 mg, Oral, Daily      escitalopram 5 MG tablet  Commonly known as: LEXAPRO   Take 1 tablet by mouth Daily.      loratadine-pseudoephedrine  MG per 24 hr tablet  Commonly known as: CLARITIN-D 24-hour   Take 1 tablet by mouth Daily.      VITAMIN B-12 CR PO   1 tablet, Oral, Daily      VITAMIN D PO   1 tablet, Oral, Daily               No Known Allergies    Discharge Disposition:  Home or Self Care.  Private vehicle with family member    Diet:  Diet Instructions       Diet: Regular/House Diet; Thin (IDDSI 0)      Discharge Diet: Regular/House Diet    Fluid Consistency: Thin (IDDSI 0)            Discharge Activity:   Activity Instructions       Activity as Tolerated      Up WIth Assist              CODE STATUS:  There are no questions and answers to display.         Future Appointments   Date Time Provider Department Center   12/29/2023  9:00 AM Flores Jones APRN [facility]       Additional Instructions for the Follow-ups that You Need to Schedule       Ambulatory Referral to Physical Therapy Evaluate and treat, POST OP   As directed      2-3x/week for 8-12 weeks  + modalities    Order Comments: 2-3x/week for 8-12 weeks + modalities    Specialty needed: Evaluate and treat POST OP   Follow-up needed: Yes        Discharge Follow-up with PCP   As directed       Currently Documented PCP:    Maria Elena Jimenez MD    PCP Phone Number:    [phone]     Follow Up Details: 1 WEEK        Discharge Follow-up  with Specified Provider: Dr Jack's office; 2 Weeks   As directed      To: Dr Jack's office   Follow Up: 2 Weeks        Discharge Follow-up with Specified Provider: dr Jack; 2 Weeks   As directed      To: dr Jack   Follow Up: 2 Weeks                Pertinent  and/or Most Recent Results     PROCEDURES:   Procedure:    RIGHT TOTAL KNEE ARTHROPLASTY.  CPT(R) Code:  03468 - [place] ARTHRP KNE CONDYLE&PLATU MEDIAL&LAT COMPARTMENTS       LAB RESULTS:      Lab 12/16/23  0404   HEMOGLOBIN 12.4*   HEMATOCRIT 36.2*         Lab 12/16/23  0404   SODIUM 135*   POTASSIUM 4.2   CHLORIDE 101   CO2 22.4   ANION GAP 11.6   BUN 12   CREATININE 1.08   EGFR 76.6   GLUCOSE 151*   CALCIUM 9.1                         Brief Urine Lab Results       None          Microbiology Results (last 10 days)       ** No results found for the last 240 hours. **            XR Knee 1 or 2 View Right    Result Date: 12/15/2023  Impression:   1. Postoperative changes from knee arthroplasty. 2. Suggested suprapatellar bursal joint effusion.      MARIA ALEJANDRA BYRNE MD       Electronically Signed and Approved By: MARIA ALEJANDRA BYRNE MD on 12/15/2023 at 13:17                          Imaging Results (All)       Procedure Component Value Units Date/Time    XR Knee 1 or 2 View Right [[id number]] Collected: 12/15/23 1317     Updated: 12/15/23 1320    Narrative:      PROCEDURE: XR KNEE 1 OR 2 VW RIGHT     COMPARISON: [facility], XR KNEE 3 VW RIGHT, 11/07/2023, 8:21.     INDICATIONS: Right Post-Op Knee Arthoplasty     FINDINGS:   There are changes from knee arthroplasty.  The radiopaque components to the prosthetic devices seem   to be in reasonable position.  There is a surgical staple line along the anterior knee.  There is   probable fluid in the suprapatellar bursal area.       Impression:         1. Postoperative changes from knee arthroplasty.  2. Suggested suprapatellar bursal joint effusion.               MARIA ALEJANDRA BYRNE MD         Electronically Signed and  Approved By: MARIA ALEJANDRA BYRNE MD on 12/15/2023 at 13:17                              Labs Pending at Discharge:          Time spent on Discharge including face to face service: 20 minutes  Part of this note may be an electronic transcription/translation of spoken language to printed text using the Dragon Dictation System.     TElectronically signed by Rico Urbano MD, 12/16/23, 2:53 PM EST.

## 2023-12-16 NOTE — THERAPY TREATMENT NOTE
Acute Care - Physical Therapy Treatment Note   [facility]     Patient Name: Agus Church  : 1960  MRN: [id number]  Today's Date: 2023      Visit Dx:     ICD-10-CM ICD-9-CM   1. Difficulty in walking  R26.2 719.7     Patient Active Problem List   Diagnosis    History of colon polyps    Primary osteoarthritis of right knee    S/P TKR (total knee replacement)    Hypertension     Past Medical History:   Diagnosis Date    Anxiety     Colon polyp     Depression     Detached retina     Gout     Hypercholesterolemia     Hypertension 12/15/2023     Past Surgical History:   Procedure Laterality Date    COLONOSCOPY N/A     COLONOSCOPY N/A 10/25/2023    Procedure: COLONOSCOPY WITH COLD SNARE POLYPECTOMY;  Surgeon: Anthony Berman MD;  Location: [facility] ENDOSCOPY;  Service: Gastroenterology;  Laterality: N/A;  COLON POLYP    CYST REMOVAL      leg    HERNIA REPAIR      x2    RETINAL DETACHMENT REPAIR      TONSILLECTOMY      TOTAL KNEE ARTHROPLASTY Right 12/15/2023    Procedure: RIGHT TOTAL KNEE ARTHROPLASTY.;  Surgeon: Jacqueline Jack MD;  Location: [facility] MAIN OR;  Service: Orthopedics;  Laterality: Right;    VASECTOMY       PT Assessment (last 12 hours)       PT Evaluation and Treatment       Row Name 23 0851          Physical Therapy Time and Intention    Subjective Information complains of;pain  -TS     Document Type therapy note (daily note)  -TS     Mode of Treatment individual therapy;physical therapy  -TS       Row Name 23 0851          Pain    Pretreatment Pain Rating /10  -TS     Pain Location - Side/Orientation Right  -TS     Pain Location - knee  -TS       Row Name 23 0851          Cognition    Affect/Mental Status (Cognition) WNL  -TS       Row Name 23 0851          Range of Motion (ROM)    Range of Motion right lower extremity ROM  AAROM R knee 5-90°  -TS       Row Name 23 0851          Mobility    Extremity Weight-bearing Status right lower extremity  -TS      Right Lower Extremity (Weight-bearing Status) weight-bearing as tolerated (WBAT)  -TS       Row Name 12/16/23 0851          Transfers    Transfers sit-stand transfer;stand-sit transfer;car transfer  -TS       Row Name 12/16/23 0851          Sit-Stand Transfer    Sit-Stand [place] (Transfers) standby assist;verbal cues  -TS     Assistive Device (Sit-Stand Transfers) walker, front-wheeled  -TS       Row Name 12/16/23 0851          Stand-Sit Transfer    Stand-Sit [place] (Transfers) standby assist;verbal cues  -TS     Assistive Device (Stand-Sit Transfers) walker, front-wheeled  -TS       Row Name 12/16/23 0851          Car Transfer    Type (Car Transfer) sit-stand;stand-sit  -TS     [place] Level (Car Transfer) standby assist;verbal cues  -TS     Assistive Device (Car Transfer) walker, front-wheeled  -TS       Row Name 12/16/23 0869          Gait/Stairs (Locomotion)    Gait/Stairs Locomotion gait/ambulation assistive device  -TS     [place] Level (Gait) standby assist;contact guard;verbal cues  -TS     Assistive Device (Gait) walker, front-wheeled  -TS     Distance in Feet (Gait) 270  -TS     Pattern (Gait) 3-point;step-through  -TS     Negotiation (Stairs) stairs independence;stairs assistive device  -TS     [place] Level (Stairs) contact guard  -TS     Handrail Location (Stairs) both sides  -TS     Number of Steps (Stairs) 4  -TS     Ascending Technique (Stairs) step-to-step  -TS     Descending Technique (Stairs) step-to-step  -TS     Stairs, Impairments ROM decreased;strength decreased;impaired balance;pain  -TS       Row Name 12/16/23 0851          Safety Issues, Functional Mobility    Impairments Affecting Function (Mobility) balance;pain;range of motion (ROM);strength  -TS       Row Name 12/16/23 0851          Motor Skills    Therapeutic Exercise hip;knee;ankle  AAROM RLE x20 reps, recumbent position  -TS       Row Name             Wound 12/15/23 1118 Right anterior knee Incision     Wound - Properties Group Placement Date: 12/15/23  -SC Placement Time: 1118  -SC Present on Original Admission: N  -SC Side: Right  -SC Orientation: anterior  -SC Location: knee  -SC Primary Wound Type: Incision  -SC    Retired Wound - Properties Group Placement Date: 12/15/23  -SC Placement Time: 1118  -SC Present on Original Admission: N  -SC Side: Right  -SC Orientation: anterior  -SC Location: knee  -SC Primary Wound Type: Incision  -SC    Retired Wound - Properties Group Date first assessed: 12/15/23  -SC Time first assessed: 1118  -SC Present on Original Admission: N  -SC Side: Right  -SC Location: knee  -SC Primary Wound Type: Incision  -SC      Row Name 12/16/23 0851          Positioning and Restraints    Pre-Treatment Position sitting in chair/recliner  -TS     Post Treatment Position chair  -TS     In Chair reclined;call light within reach;exit alarm on;heels elevated  -TS       Row Name 12/16/23 0851          Progress Summary (PT)    Progress Toward Functional Goals (PT) progress toward functional goals is good  -TS               User Key  (r) = Recorded By, (t) = Taken By, (c) = Cosigned By      Initials Name Provider Type    Evangelista Mera PTA Physical Therapist Assistant    Tracy Tyler RN Registered Nurse                Right Knee Ther-ex   Exercise  Reps  Sets    Long arc Quads   10 2   Short arc Quads  10 2   Heel Slides  10 2   Ankle Pumps  10 2   Quad sets  10 2   Glut sets  10 2   Straight leg raise  10 2          Physical Therapy Education       Title: PT OT SLP Therapies (Done)       Topic: Physical Therapy (Done)       Point: Mobility training (Done)       Learning Progress Summary             Patient Acceptance, E,TB, VU,NR by JAY at 12/15/2023 2114    Acceptance, E,TB, VU by WOJCIECH at 12/15/2023 1346                         Point: Precautions (Done)       Learning Progress Summary             Patient Acceptance, E,TB, VU,NR by JAY at 12/15/2023 2114    Acceptance, E,TB, VU by WOJCIECH at  12/15/2023 1346                                         User Key       Initials Effective Dates Name Provider Type Discipline    EV 06/16/21 -  Pamela Dubon RN Registered Nurse Nurse    WOJCIECH 06/03/21 -  Ghanshyam Tucker, PT Physical Therapist PT                  PT Recommendation and Plan     Progress Summary (PT)  Progress Toward Functional Goals (PT): progress toward functional goals is good   Outcome Measures       Row Name 12/16/23 0858 12/15/23 1346 12/15/23 1339       How much help from another person do you currently need...    Turning from your back to your side while in flat bed without using bedrails? 4  -TS 3  -WOJCIECH --    Moving from lying on back to sitting on the side of a flat bed without bedrails? 4  -TS 3  -WOJCIECH --    Moving to and from a bed to a chair (including a wheelchair)? 3  -TS 3  -WOJCIECH --    Standing up from a chair using your arms (e.g., wheelchair, bedside chair)? 4  -TS 3  -WOJCIECH --    Climbing 3-5 steps with a railing? 3  -TS 3  -WOJCIECH --    To walk in hospital room? 3  -TS 3  -WOJCIECH --    AM-PAC 6 Clicks Score (PT) 21  -TS 18  -WOJCIECH --    Highest Level of Mobility Goal 6 --> Walk 10 steps or more  -TS 6 --> Walk 10 steps or more  -WOJCIECH --       Functional Assessment    Outcome Measure Options -- -- AM-PAC 6 Clicks Basic Mobility (PT)  -WOJCIECH              User Key  (r) = Recorded By, (t) = Taken By, (c) = Cosigned By      Initials Name Provider Type    TS Evangelista Whitehead PTA Physical Therapist Assistant    Ghanshyam Lewis, PT Physical Therapist                     Time Calculation:    PT Charges       Row Name 12/16/23 0850             Time Calculation    PT Received On 12/16/23  -TS      PT Goal Re-Cert Due Date 12/24/23  -TS         Timed Charges    06649 - PT Therapeutic Exercise Minutes 17  -TS      33389 - Gait Training Minutes  10  -TS      06241 - PT Therapeutic Activity Minutes 3  -TS         Total Minutes    Timed Charges Total Minutes 30  -TS       Total Minutes 30  -TS                User  Key  (r) = Recorded By, (t) = Taken By, (c) = Cosigned By      Initials Name Provider Type    Evangelista Mera PTA Physical Therapist Assistant                  Therapy Charges for Today       Code Description Service Date Service Provider Modifiers Qty    [id number]  PT THER PROC EA 15 MIN 12/16/2023 Evangelista Whitehead PTA GP 1    [id number]  GAIT TRAINING EA 15 MIN 12/16/2023 Evangelista Whitehead PTA GP 1            PT G-Codes  Outcome Measure Options: AM-PAC 6 Clicks Basic Mobility (PT)  AM-PAC 6 Clicks Score (PT): 21    Evangelista Whitehead PTA  12/16/2023

## 2023-12-16 NOTE — PROGRESS NOTES
" Orthopedic Total Knee Progress Note    Assessment/Plan outpatient PT and Zac, DVT prophylaxis, weight-bear as tolerated, range of motion, follow-up 2 weeks    Status post-right total knee arthroplasty: Doing well postoperatively.    Pain Relief: some relief    Continues current post-op course    Activity: up with assistance    Weight Bearing: WBAT     LOS: 0 days     Subjective     Post-Operative Day: 1 post-right total knee arthroplasty  Systemic or Specific Complaints: No Complaints    Objective     Vital signs in last 24 hours:  Vitals:    12/15/23 2339 12/16/23 0333 12/16/23 0745 12/16/23 0750   BP: 115/77 119/65  135/66   BP Location: Right arm Right arm  Right arm   Patient Position: Lying Lying  Sitting   Pulse: 63 57  66   Resp: 18 18  18   Temp: 97.9 °F (36.6 °C) 98.2 °F (36.8 °C)  97.9 °F (36.6 °C)   TempSrc: Oral Oral  Oral   SpO2: 97% 97% 97% 92%   Weight:       Height:            General: alert, appears stated age, and cooperative   Neurovascular: Tibial nerve: Intact, Superficial peroneal nerve: Intact, and Deep peroneal nerve: Intact  Capillary refill: Normal   Wound: Wound clean and dry no evidence of infection.   Range of Motion: Limited flexsion and Limited extension   DVT Exam: No evidence of DVT seen on physical exam.      Hemoglobin   Date Value Ref Range Status   12/16/2023 12.4 (L) 13.0 - 17.7 g/dL Final     Hematocrit   Date Value Ref Range Status   12/16/2023 36.2 (L) 37.5 - 51.0 % Final        Basic Metabolic Panel    Sodium Sodium   Date Value Ref Range Status   12/16/2023 135 (L) 136 - 145 mmol/L Final      Potassium Potassium   Date Value Ref Range Status   12/16/2023 4.2 3.5 - 5.2 mmol/L Final      Chloride Chloride   Date Value Ref Range Status   12/16/2023 101 98 - 107 mmol/L Final      Bicarbonate No results found for: \"PLASMABICARB\"   BUN BUN   Date Value Ref Range Status   12/16/2023 12 8 - 23 mg/dL Final      Creatinine Creatinine   Date Value Ref Range Status   12/16/2023 " "1.08 0.76 - 1.27 mg/dL Final      Calcium Calcium   Date Value Ref Range Status   12/16/2023 9.1 8.6 - 10.5 mg/dL Final      Glucose      No components found for: \"GLUCOSE.*\"      XR Knee 1 or 2 View Right   Final Result       1. Postoperative changes from knee arthroplasty.   2. Suggested suprapatellar bursal joint effusion.                    MARIA ALEJANDRA BYRNE MD          Electronically Signed and Approved By: MARIA ALEJANDRA BYRNE MD on 12/15/2023 at 13:17                                "

## 2023-12-16 NOTE — PLAN OF CARE
Goal Outcome Evaluation:  Plan of Care Reviewed With: patient        Progress: improving  Outcome Evaluation: pt medicated with scheduled pain medication for right knee with voiced relief. pt up to bathroom with 1 assist/gait belt/walker, voiding without difficulty. no changes in pt's status.

## 2023-12-20 ENCOUNTER — TELEPHONE (OUTPATIENT)
Dept: ORTHOPEDIC SURGERY | Facility: CLINIC | Age: 63
End: 2023-12-20
Payer: COMMERCIAL

## 2023-12-20 DIAGNOSIS — R26.2 DIFFICULTY IN WALKING: ICD-10-CM

## 2023-12-20 RX ORDER — HYDROCODONE BITARTRATE AND ACETAMINOPHEN 7.5; 325 MG/1; MG/1
1 TABLET ORAL EVERY 4 HOURS PRN
Qty: 42 TABLET | Refills: 0 | Status: SHIPPED | OUTPATIENT
Start: 2023-12-20

## 2023-12-22 ENCOUNTER — TELEPHONE (OUTPATIENT)
Dept: ORTHOPEDIC SURGERY | Facility: CLINIC | Age: 63
End: 2023-12-22
Payer: COMMERCIAL

## 2023-12-22 RX ORDER — APIXABAN 2.5 MG/1
TABLET, FILM COATED ORAL
Qty: 20 TABLET | Refills: 0 | OUTPATIENT
Start: 2023-12-22

## 2023-12-22 NOTE — TELEPHONE ENCOUNTER
WIFE OF PATIENT CALLED AND IS CONCERNS ABOUT THE DRAINAGE ON DRESSING AND STAPLES PULLING.  WIFE WAS INSTRUCTED TO REMOVE DRESSING, CLEAN INCISION AND SNED PICTURES FOR REVIEW.  PICTURES RECEIVED AND REVIEWED.  SWELLING NOTED BUT NO DRAINAGE FROM INCISION.  WIFE INSTRUCTED TO ICE, ELEVATE AND CONTINUE WITH TEDS STOCKINGS.  WIFE VOICES UNDERSTANDING AND WILL CALL THE OFFICE WITH ADDITIONAL ISSUES OR CONCERNS.

## 2023-12-29 ENCOUNTER — OFFICE VISIT (OUTPATIENT)
Dept: ORTHOPEDIC SURGERY | Facility: CLINIC | Age: 63
End: 2023-12-29
Payer: COMMERCIAL

## 2023-12-29 VITALS
SYSTOLIC BLOOD PRESSURE: 117 MMHG | BODY MASS INDEX: 33.44 KG/M2 | HEART RATE: 69 BPM | DIASTOLIC BLOOD PRESSURE: 80 MMHG | HEIGHT: 72 IN | WEIGHT: 246.91 LBS | OXYGEN SATURATION: 96 %

## 2023-12-29 DIAGNOSIS — Z96.651 S/P TOTAL KNEE ARTHROPLASTY, RIGHT: ICD-10-CM

## 2023-12-29 DIAGNOSIS — R26.2 DIFFICULTY IN WALKING: ICD-10-CM

## 2023-12-29 DIAGNOSIS — M25.561 RIGHT KNEE PAIN, UNSPECIFIED CHRONICITY: Primary | ICD-10-CM

## 2023-12-29 PROCEDURE — 99024 POSTOP FOLLOW-UP VISIT: CPT

## 2023-12-29 RX ORDER — HYDROCODONE BITARTRATE AND ACETAMINOPHEN 7.5; 325 MG/1; MG/1
1 TABLET ORAL EVERY 4 HOURS PRN
Qty: 42 TABLET | Refills: 0 | Status: SHIPPED | OUTPATIENT
Start: 2023-12-29

## 2023-12-29 RX ORDER — LACTULOSE 10 G/15ML
SOLUTION ORAL
COMMUNITY
Start: 2023-12-21

## 2023-12-29 NOTE — PROGRESS NOTES
"Chief Complaint  Pain and Follow-up of the Right Knee and Suture / Staple Removal    Subjective      Agus KHUSHI Church presents to [facility] for 2-week follow-up of right total knee arthroplasty with Dr. Jack on 12/15/2023.  Patient is attending physical therapy with PT practices and doing very well.  Patient reports that his motion with PT has been 0 to 109 degrees.  He is ambulatory without use of assistive devices.  Reports that he has had some pain in his hip due to compensation.  Overall he is doing very well.    Objective   No Known Allergies    Vital Signs:   /80   Pulse 69   Ht 182.9 cm (72\")   Wt 112 kg (246 lb 14.6 oz)   SpO2 96%   BMI 33.49 kg/m²       Physical Exam    Constitutional: Awake, alert. Well nourished appearance.    Integumentary: Warm, dry, intact. No obvious rashes.    HENT: Atraumatic, normocephalic.   Respiratory: Non labored respirations .   Cardiovascular: Intact peripheral pulses.    Psychiatric: Normal mood and affect. A&O X3    Ortho Exam  Right knee: Incision is well-approximated and healing appropriately.  There is no redness, drainage or tenderness.  mild edema generalized over the knee.  Stable to varus and valgus stress.  Stable to anterior and posterior drawer test.  Range of motion 0 to 105 degrees.  Neurovascular intact.    Imaging Results (Most Recent)       Procedure Component Value Units Date/Time    XR Knee 3 View Right [[id number]] Resulted: 01/02/24 1320     Updated: 01/02/24 1321    Narrative:      X-Ray Report:  Study: X-rays ordered, taken in the office, and reviewed today.   Site: Right knee xray  Indication: Right TKA  View: AP/Lateral, Standing, and [place] view(s)  Findings: Intact right total knee arthroplasty. No evidence of hardware   malfunction or periprosthetic fracture.  Patella centrally tracking.   Prior studies available for comparison: yes                       Assessment and Plan   Problem List Items Addressed This " Patient:  Michoacano Aparicio Date:  2021   :  1971 Attending:  Sanya Robert MD   49 year old male      CARDIOLOGY CONSULTATION:  2021       ATTENDING     Sanya Robert MD    CHIEF COMPLAINT     PVC    HISTORY     Michoacano Aparicio is a 49 year old male who is seen at the request of Dr. Sanya Robert MD for premature ventricular contraction.  The patient presented into the emergency room with complaints of discomfort in his abdomen.  He was diagnosed with diverticulitis.  While the patient was being monitored on telemetry he was noted to have frequent PVCs.  He states that he has had episodes of fluttering in his chest in the past.  He denies any discomfort in his chest or shortness of breath.  Patient has not had any lightheadedness or dizziness.    Stress echocardiogram 2013  The patient exercised 11.7 Mets.  There was no electrocardiographic or echocardiographic evidence of ischemia    MEDICAL HISTORY     Past Medical History:   Diagnosis Date   • Essential (primary) hypertension    • RAD (reactive airway disease)     childhood- exercise or pollen       SURGICAL HISTORY     Past Surgical History:   Procedure Laterality Date   • Hernia repair      x 2. childhood an dthen 10 years ago. inguinal hernia       SOCIAL HISTORY     Social History     Tobacco Use   • Smoking status: Light Tobacco Smoker     Types: Cigars   • Smokeless tobacco: Never Used   • Tobacco comment: once a month   Substance Use Topics   • Alcohol use: Yes     Comment: SOCIALLY   • Drug use: Not Currently       FAMILY HISTORY     Family History   Problem Relation Age of Onset   • Asthma Father    • Hypertension Father    • Hearing Loss Father    • Diabetes Paternal Grandfather        MEDICATIONS     Current Facility-Administered Medications   Medication   • pantoprazole (PROTONIX) EC tablet 40 mg   • albuterol inhaler 2 puff   • hydrALAZINE (APRESOLINE) tablet 10 mg   • amLODIPine (NORVASC) tablet 7.5 mg   • sodium  Visit    None  Visit Diagnoses       Right knee pain, unspecified chronicity    -  Primary    Relevant Orders    XR Knee 3 View Right (Completed)    S/P total knee arthroplasty, right                Follow Up   Return in about 4 weeks (around 1/26/2024).    Patient is a non-smoker, did not discuss options for smoking cessation.     Social History     Socioeconomic History    Marital status:    Tobacco Use    Smoking status: Never    Smokeless tobacco: Never   Vaping Use    Vaping Use: Never used   Substance and Sexual Activity    Alcohol use: Yes     Comment: weekends and special occ    Drug use: Never    Sexual activity: Defer       Patient Instructions   X-rays taken and reviewed, showing intact hardware.    Staples removed in office and Steri-Strips applied.  Patient educated on incision care.  Please keep incision clean and dry.  Do not soak or submerge in water until incision is fully healed.  Do not apply creams or lotions over the incision.  Please allow Steri-Strips to fall off on their own within 7 to 10 days.    Continue icing and elevation of the knee as needed to help with pain and swelling.  Ice knee up to 3 or 4 times daily for no longer than 15 to 20 minutes at a time.    Continue PT to progress ROM, strength, and weightbearing status.    Follow-up in 4 weeks. Repeat x-rays not needed at this visit.  Please call with questions or concerns.   Patient was given instructions and counseling regarding his condition or for health maintenance advice. Please see specific information pulled into the AVS if appropriate.         chloride 0.9 % flush bag 25 mL   • sodium chloride (PF) 0.9 % injection 2 mL   • hydrALAZINE (APRESOLINE) injection 10 mg   • ondansetron (ZOFRAN) injection 4 mg   • HYDROmorphone (DILAUDID) injection 0.4 mg   • piperacillin-tazobactam (ZOSYN) 3.375 g in sodium chloride 0.9 % 100 mL IVPB   • metroNIDAZOLE (FLAGYL) IVPB 500 mg   • acetaminophen (TYLENOL) tablet 650 mg   • HYDROcodone-acetaminophen (NORCO) 5-325 MG per tablet 1 tablet       ALLERGIES   ALLERGIES:  No Known Allergies    REVIEW OF SYSTEMS     Constitutional:  Patient denies fever, chills, malaise.    Eyes:  Denies change in visual acuity, pain, burning or itching.    Immunologic:  Denies hives, seasonal allergies.    HENT:  Denies sinus problems, ear infections, nasal congestion or sore throat.    Respiratory:  Denies cough, or shortness of breath  Cardiovascular:  Denies chest pain or lower extremity edema    Gastrointestinal:  Denies  nausea, vomiting, bloody stools or diarrhea.  Patient is having some abdominal pain.    Genitourinary:  Denies urine retention, painful urination, urinary frequency, blood in urine or nocturia.    Musculoskeletal:  Denies back pain, neck pain, joint pain or leg swelling.    Integument:  Denies rash, itching.    Neurologic:  Denies headache, focal weakness or sensory changes.    Endocrine:  Denies polyuria, polydipsia or temperature intolerance.    Lymphatic:  Denies swollen glands, weight loss.    All other systems reviewed and negative.      PHYSICAL EXAM     Vital Signs:    Vitals:    05/15/21 1927 05/16/21 0018 05/16/21 0556 05/16/21 0720   BP: (!) 157/89 (!) 142/88 (!) 144/85 (!) 148/84   BP Location: LUE - Left upper extremity LUE - Left upper extremity LUE - Left upper extremity LUE - Left upper extremity   Patient Position: Semi-Brantley's Semi-Brantley's Semi-Brantley's Sitting   Pulse: 93 (!) 101 93 82   Resp: 16 16 16 16   Temp: 97.9 °F (36.6 °C) 97.6 °F (36.4 °C) 98.5 °F (36.9 °C) 97.6 °F (36.4 °C)   TempSrc: Oral  Oral Oral Oral   SpO2: 98% 98% 96% 97%   Weight:       Height:       ,   Ht Readings from Last 1 Encounters:   05/13/21 6' 3\" (1.905 m)   ,   Wt Readings from Last 1 Encounters:   05/13/21 104.3 kg   , Body mass index is 28.75 kg/m².  General:  No acute distress, pleasant.   HEENT:  Normocephalic, atraumatic, EOM (extraocular movements) intact, no scleral icterus or conjunctival pallor, no nasal discharge,  Neck:  Trachea midline, supple,  no carotid bruits, no JVD (jugular venous distension).  Normal carotid upstroke.  Pulmonary:  Lungs are clear to auscultation bilaterally.  Patient has no rales or wheezing   Cardiovascular:  PMI in 5th intercostal space.  Regular rate and rhythm normal S1-S2 with no S3-S4 murmurs appreciated  Abdomen:  Bowel sounds normoactive, soft, nontender, nondistended, no hepatosplenomegaly.  Extremities:  No edema or cyanosis,   Neuro:  Alert and oriented X3, CN (cranial nerves) 2-12 grossly intact, no gross motor or sensory deficits.       DIAGNOSTIC STUDIES    CT ABDOMEN PELVIS W CONTRAST   Final Result   IMPRESSION:   1. Diverticulitis of the sigmoid colon with evidence of microperforation   and gas in the mesenteric fat. No well-defined abscess is seen.   2. There is a small amount of free fluid increased when compared to the   prior study.   3. Mild splenomegaly      XR CHEST PA AND LATERAL   Final Result   IMPRESSION:  No evidence for active disease in the chest.      CT ABDOMEN PELVIS W CONTRAST   Final Result   IMPRESSION: Sigmoid colon diverticulitis, with contained perforation within   the mesenteric fat adjacent to the transverse sigmoid colon. No evidence of   organized abscess.      Negative CT upper abdomen. Normal appendix.             EKG:  Results for orders placed or performed during the hospital encounter of 05/13/21   Electrocardiogram 12-Lead   Result Value Ref Range    Ventricular Rate EKG/Min (BPM) 98     Atrial Rate (BPM) 98     MN-Interval (MSEC) 150      QRS-Interval (MSEC) 94     QT-Interval (MSEC) 354     QTc 452     P Axis (Degrees) 74     R Axis (Degrees) 88     T Axis (Degrees) -61     REPORT TEXT       Sinus rhythm  with  premature atrial complexes  with  aberrant conduction  Biatrial enlargement  Nonspecific T wave abnormality  Abnormal ECG  When compared with ECG of  15-MAY-2021 09:01,  aberrant conduction  is now  present  ST no longer elevated in  Lateral leads  T wave inversion now evident in  Lateral leads           EKG:      Laboratory Results:    Recent Labs   Lab 05/16/21  0512 05/15/21  0611 05/14/21  2358 05/14/21  1424 05/14/21  0512   SODIUM 140 136  --  136 135   POTASSIUM 3.6 4.0  --  3.9 4.1   BUN 11 16  --  14 15   CREATININE 0.92 0.95  --  1.05 0.96   GLUCOSE 104* 119*  --  121* 124*   MG 2.2  --   --   --   --    WBC 9.9  --  18.5*  --  21.4*   HGB 13.3  --  14.5  --  14.9   HCT 39.3  --  42.9  --  43.9     --  220  --  213             ASSESSMENT     1. Acute diverticulitis  2. Hypertension  3. Frequent PVCs     PLAN     Mr. Michoacano Aparicio is a pleasant 49-year-old white gentleman with history of hypertension who presented with complaints of abdominal pain.  He has been diagnosed with acute diverticulitis which will be managed medically.  On telemetry the patient is noted to have frequent PVCs.  Will obtain an echocardiogram to assess his ventricular function.  I will start him on a beta-blocker.    Thank you for allowing me to participate in the care of this patient.    Kameron Romero M.D.-Ph.D. [facility]

## 2024-01-10 ENCOUNTER — TELEPHONE (OUTPATIENT)
Dept: ORTHOPEDIC SURGERY | Facility: CLINIC | Age: 64
End: 2024-01-10
Payer: COMMERCIAL

## 2024-01-10 DIAGNOSIS — R26.2 DIFFICULTY IN WALKING: ICD-10-CM

## 2024-01-11 RX ORDER — HYDROCODONE BITARTRATE AND ACETAMINOPHEN 7.5; 325 MG/1; MG/1
1 TABLET ORAL EVERY 4 HOURS PRN
Qty: 42 TABLET | Refills: 0 | Status: SHIPPED | OUTPATIENT
Start: 2024-01-11 | End: 2024-01-12 | Stop reason: RX

## 2024-01-12 DIAGNOSIS — Z96.651 S/P TOTAL KNEE ARTHROPLASTY, RIGHT: Primary | ICD-10-CM

## 2024-01-12 RX ORDER — HYDROCODONE BITARTRATE AND ACETAMINOPHEN 7.5; 325 MG/1; MG/1
1 TABLET ORAL EVERY 6 HOURS PRN
Qty: 30 TABLET | Refills: 0 | Status: SHIPPED | OUTPATIENT
Start: 2024-01-12

## 2024-01-19 ENCOUNTER — TELEPHONE (OUTPATIENT)
Dept: ORTHOPEDIC SURGERY | Facility: CLINIC | Age: 64
End: 2024-01-19
Payer: COMMERCIAL

## 2024-01-19 NOTE — TELEPHONE ENCOUNTER
Provider: MARIA ALEJANDRA    Caller: [email or web address]    Relationship to Patient: SELF    Pharmacy:     Phone Number: [phone]    Reason for Call: PT CALLING TO SPEAK WITH NAPOLEON ABOUT HER RETURN TO WORK DATE

## 2024-01-26 ENCOUNTER — OFFICE VISIT (OUTPATIENT)
Dept: ORTHOPEDIC SURGERY | Facility: CLINIC | Age: 64
End: 2024-01-26
Payer: COMMERCIAL

## 2024-01-26 VITALS — HEART RATE: 80 BPM | DIASTOLIC BLOOD PRESSURE: 68 MMHG | OXYGEN SATURATION: 94 % | SYSTOLIC BLOOD PRESSURE: 103 MMHG

## 2024-01-26 DIAGNOSIS — Z96.651 S/P TOTAL KNEE ARTHROPLASTY, RIGHT: Primary | ICD-10-CM

## 2024-01-26 PROCEDURE — 99024 POSTOP FOLLOW-UP VISIT: CPT

## 2024-01-26 RX ORDER — HYDROCODONE BITARTRATE AND ACETAMINOPHEN 5; 325 MG/1; MG/1
1 TABLET ORAL EVERY 6 HOURS PRN
Qty: 28 TABLET | Refills: 0 | Status: SHIPPED | OUTPATIENT
Start: 2024-01-26

## 2024-02-01 NOTE — PROGRESS NOTES
Chief Complaint  Follow-up of the Right Knee    Subjective      Agus Church presents to [facility] for 6-week follow-up of right total knee arthroplasty with Dr. Jack on 12/15/2023.  Patient is attending physical therapy with PT practices and is scheduled through next week.  Reports that he is still having difficulty with driving.  Overall doing well.    Objective   No Known Allergies    Vital Signs:   /68   Pulse 80   SpO2 94%       Physical Exam    Constitutional: Awake, alert. Well nourished appearance.    Integumentary: Warm, dry, intact. No obvious rashes.    HENT: Atraumatic, normocephalic.   Respiratory: Non labored respirations .   Cardiovascular: Intact peripheral pulses.    Psychiatric: Normal mood and affect. A&O X3    Ortho Exam  Right knee: Incision is completely healed and well-approximated.  There is no redness, drainage or tenderness.  Range of motion 0 to 120 degrees.  Stable to varus and valgus stress.  Stable to anterior posterior drawer test.  Neurovascular intact.  Sensation is intact.    Imaging Results (Most Recent)       None                      Assessment and Plan   Problem List Items Addressed This Visit    None  Visit Diagnoses       S/P total knee arthroplasty, right    -  Primary    Relevant Orders    Ambulatory Referral to Physical Therapy Evaluate and treat, POST OP; Stretching, ROM, Strengthening (Completed)            Follow Up   Return in about 6 weeks (around 3/8/2024).    Patient is a non-smoker, did not discuss options for smoking cessation.     Social History     Socioeconomic History    Marital status:    Tobacco Use    Smoking status: Never    Smokeless tobacco: Never   Vaping Use    Vaping Use: Never used   Substance and Sexual Activity    Alcohol use: Yes     Comment: weekends and special occ    Drug use: Never    Sexual activity: Defer       Patient Instructions   Patient is doing very well. Advised to continue PT to  completion to progress strength and ROM. Continue home exercises.     Continue icing knee as needed up to 4 times daily for no longer than 15-20 mins at a time.     Follow-up in 6 weeks. Repeat x-rays needed. Call with changes or concerns.     Patient was given instructions and counseling regarding his condition or for health maintenance advice. Please see specific information pulled into the AVS if appropriate.

## 2024-02-23 ENCOUNTER — OFFICE VISIT (OUTPATIENT)
Dept: ORTHOPEDIC SURGERY | Facility: CLINIC | Age: 64
End: 2024-02-23
Payer: COMMERCIAL

## 2024-02-23 VITALS — HEART RATE: 88 BPM | DIASTOLIC BLOOD PRESSURE: 75 MMHG | SYSTOLIC BLOOD PRESSURE: 131 MMHG | OXYGEN SATURATION: 93 %

## 2024-02-23 DIAGNOSIS — M25.561 RIGHT KNEE PAIN, UNSPECIFIED CHRONICITY: Primary | ICD-10-CM

## 2024-02-23 DIAGNOSIS — Z96.651 S/P TKR (TOTAL KNEE REPLACEMENT), RIGHT: ICD-10-CM

## 2024-02-23 PROCEDURE — 99024 POSTOP FOLLOW-UP VISIT: CPT

## 2024-02-23 RX ORDER — DICLOFENAC SODIUM 75 MG/1
75 TABLET, DELAYED RELEASE ORAL 2 TIMES DAILY
Qty: 60 TABLET | Refills: 2 | Status: SHIPPED | OUTPATIENT
Start: 2024-02-23

## 2024-02-23 NOTE — PATIENT INSTRUCTIONS
X-rays taken and reviewed with patient.  Hardware is intact.       Patient is doing well.  Encouraged to continue home exercises for ROM and strengthening. May continue icing as needed for no more than 20 minutes at a time for comfort and inflammation.     May apply Vitamin E, cocoa butter, etc. over incision to aid in scar appearance.     Encouraged to continue avoiding kneeling directly on prosthetic and avoid deep squatting.    Educated about needing dental prophylaxis for life.  Pt is to call our office or the dentist to receive an order for antibiotics prior to dental procedure.    Educated that numbness can improve for up to 1 year, however at the year joey if still experiencing numbness it may not return.     Follow-up in 9 months for updated x-rays at the 1 year anniversary. Call with questions or concerns.

## 2024-02-23 NOTE — PROGRESS NOTES
Chief Complaint  Follow-up of the Right Knee    Subjective      Agus Church presents to [facility] for 3-month follow-up of right total knee arthroplasty with Dr. Jack on 12/15/2023.  Patient reports that he has noticed an increase in his flexibility.  He is returned back to work for 1 week and notices that after 4 to 5-hour shift it is very stiff and achy.  Reports that he has 4 visits left of physical therapy with PT practices.  He is unsure if he will need to continue beyond this.  Reports that he would like to be able to crouch down into a catchers position.  Overall he is doing very well.    Objective   No Known Allergies    Vital Signs:   /75   Pulse 88   SpO2 93%       Physical Exam    Constitutional: Awake, alert. Well nourished appearance.    Integumentary: Warm, dry, intact. No obvious rashes.    HENT: Atraumatic, normocephalic.   Respiratory: Non labored respirations .   Cardiovascular: Intact peripheral pulses.    Psychiatric: Normal mood and affect. A&O X3    Ortho Exam  Right knee: Range of motion 0 to 130 degrees.  Stable to varus and valgus stress.  Stable to anterior posterior drawer test.  Neurovascular intact.  Sensation is intact.  Incision is completely healed well-approximated.  There is no redness, drainage or tenderness.  No edema noted.    Imaging Results (Most Recent)       Procedure Component Value Units Date/Time    XR Knee 3 View Right [[id number]] Resulted: 02/27/24 0907     Updated: 02/27/24 0907    Narrative:      X-Ray Report:  Study: X-rays ordered, taken in the office, and reviewed today.   Site: Right knee xray  Indication: Right TKA  View: AP/Lateral, Standing, and [place] view(s)  Findings: Intact right total knee arthroplasty. No evidence of hardware   malfunction.   Prior studies available for comparison: yes                       Assessment and Plan   Problem List Items Addressed This Visit    None  Visit Diagnoses       Right knee  pain, unspecified chronicity    -  Primary    Relevant Orders    XR Knee 3 View Right (Completed)    S/P TKR (total knee replacement), right        Relevant Orders    Ambulatory Referral to Physical Therapy Evaluate and treat, POST OP; Stretching, ROM, Strengthening (Completed)            Follow Up   Return for Annual Recheck.    Patient is a non-smoker, did not discuss options for smoking cessation.     Social History     Socioeconomic History    Marital status:    Tobacco Use    Smoking status: Never    Smokeless tobacco: Never   Vaping Use    Vaping Use: Never used   Substance and Sexual Activity    Alcohol use: Yes     Comment: weekends and special occ    Drug use: Never    Sexual activity: Defer       Patient Instructions   X-rays taken and reviewed with patient.  Hardware is intact.       Patient is doing well.  Encouraged to continue home exercises for ROM and strengthening. May continue icing as needed for no more than 20 minutes at a time for comfort and inflammation.     May apply Vitamin E, cocoa butter, etc. over incision to aid in scar appearance.     Encouraged to continue avoiding kneeling directly on prosthetic and avoid deep squatting.    Educated about needing dental prophylaxis for life.  Pt is to call our office or the dentist to receive an order for antibiotics prior to dental procedure.    Educated that numbness can improve for up to 1 year, however at the year joey if still experiencing numbness it may not return.     Follow-up in 9 months for updated x-rays at the 1 year anniversary. Call with questions or concerns.    Patient was given instructions and counseling regarding his condition or for health maintenance advice. Please see specific information pulled into the AVS if appropriate.

## 2024-03-27 ENCOUNTER — TELEPHONE (OUTPATIENT)
Dept: ORTHOPEDIC SURGERY | Facility: CLINIC | Age: 64
End: 2024-03-27
Payer: COMMERCIAL

## 2024-03-27 NOTE — TELEPHONE ENCOUNTER
Caller: Agus Church    Relationship: Self    Best call back number: [phone]    What form or medical record are you requesting: PHYSICAL THERAPY ORDER FOR THE RIGHT KNEE     Who is requesting this form or medical record from you: PATIENT     How would you like to receive the form or medical records (pick-up, mail, fax): FAX   If fax, what is the fax number: [phone]  Timeframe paperwork needed: ASAP     Additional notes: PT PRACTICES

## 2024-04-03 ENCOUNTER — TELEPHONE (OUTPATIENT)
Dept: ORTHOPEDIC SURGERY | Facility: CLINIC | Age: 64
End: 2024-04-03
Payer: COMMERCIAL

## 2024-04-03 NOTE — TELEPHONE ENCOUNTER
LVM FOR PT TO RETURN CALL TO RESCHEDULE APT ON 12/20/24 TO ILENE OR DR BESS SCHEDULE ON A TUESDAY, THURS, OR FRIDAY MORNING.  OK FOR HUB TO SCHEDULE.

## 2024-04-08 DIAGNOSIS — Z96.651 S/P TOTAL KNEE ARTHROPLASTY, RIGHT: Primary | ICD-10-CM

## 2024-07-25 ENCOUNTER — OFFICE VISIT (OUTPATIENT)
Dept: ORTHOPEDIC SURGERY | Facility: CLINIC | Age: 64
End: 2024-07-25
Payer: COMMERCIAL

## 2024-07-25 VITALS
DIASTOLIC BLOOD PRESSURE: 74 MMHG | SYSTOLIC BLOOD PRESSURE: 105 MMHG | WEIGHT: 246 LBS | OXYGEN SATURATION: 95 % | HEART RATE: 78 BPM | BODY MASS INDEX: 33.32 KG/M2 | HEIGHT: 72 IN

## 2024-07-25 DIAGNOSIS — M25.511 RIGHT SHOULDER PAIN, UNSPECIFIED CHRONICITY: Primary | ICD-10-CM

## 2024-07-25 DIAGNOSIS — S49.91XA RIGHT SHOULDER INJURY, INITIAL ENCOUNTER: ICD-10-CM

## 2024-07-25 NOTE — PROGRESS NOTES
"Chief Complaint  Pain and Initial Evaluation of the Right Shoulder     Subjective      Agus Church presents to [facility] for initial evaluation of the right shoulder. He was trying to pry a bar apart and his right shoulder popped.  He has been going to the gym and tried to do gentle exercises but is still having pain in the right shoulder.  He noted that was about a month ago.      No Known Allergies     Social History     Socioeconomic History    Marital status:    Tobacco Use    Smoking status: Never    Smokeless tobacco: Never   Vaping Use    Vaping status: Never Used   Substance and Sexual Activity    Alcohol use: Yes     Comment: weekends and special occ    Drug use: Never    Sexual activity: Defer        I reviewed the patient's chief complaint, history of present illness, review of systems, past medical history, surgical history, family history, social history, medications, and allergy list.     Review of Systems     Constitutional: Denies fevers, chills, weight loss  Cardiovascular: Denies chest pain, shortness of breath  Skin: Denies rashes, acute skin changes  Neurologic: Denies headache, loss of consciousness      Vital Signs:   /74   Pulse 78   Ht 182.9 cm (72\")   Wt 112 kg (246 lb)   SpO2 95%   BMI 33.36 kg/m²          Physical Exam  General: Alert. No acute distress    Ortho Exam        RIGHT SHOULDER Forward flexion 170 with pain. Abduction 100. External rotation 60. Internal rotation L3. Positive Cross body adduction. Supraspinatus strength 4+/5. Infraspinatus Strength 5/5. Infrared subscap 5/5. Negative Mondragon. Negative Neer. Negative Apprehension. Positive Lift off. (Negative Obriens. Sensation intact to light touch, median, radial, ulnar nerve. Positive AIN, PIN, ulnar nerve motor. Positive pulses. Negative Impingement signs. Good strength in triceps, biceps, deltoid, wrist extensors and wrist flexors. Tender to palpation to the anterior aspect " of the shoulder and pain with internal and eternal rotation.  Positive Empty Can test.        Procedures      Imaging Results (Most Recent)       Procedure Component Value Units Date/Time    XR Scapula Right [[id number]] Resulted: 07/25/24 1512     Updated: 07/25/24 1518             Result Review :      X-Ray Report:  Right scapula X-Ray  Indication: Evaluation of the right scapula  AP/Lateral view(s)  Findings: Moderate AC joint arthritis. No acute fracture or dislocation.   Prior studies available for comparison: no        Assessment and Plan     Diagnoses and all orders for this visit:    1. Right shoulder pain, unspecified chronicity (Primary)  -     XR Scapula Right    2. Right shoulder injury, initial encounter        Discussed the treatment plan with the patient. I reviewed the X-rays that were obtained today with the patient.     Continue anti inflammatory.      HEP exercises. MRI of the right shoulder to assess the structure.     Call or return if worsening symptoms.    Follow Up     After MRI of the right shoulder.       Patient was given instructions and counseling regarding his condition or for health maintenance advice. Please see specific information pulled into the AVS if appropriate.     Scribed for Jacqueline Jack MD by Kelsy Lucero MA.  07/25/24   15:09 EDT    I have personally performed the services described in this document as scribed by the above individual and it is both accurate and complete. Jacqueline Jack MD 07/25/24

## 2024-08-08 ENCOUNTER — HOSPITAL ENCOUNTER (OUTPATIENT)
Dept: MRI IMAGING | Facility: HOSPITAL | Age: 64
Discharge: HOME | End: 2024-08-08
Admitting: ORTHOPAEDIC SURGERY
Payer: COMMERCIAL

## 2024-08-08 DIAGNOSIS — S49.91XA RIGHT SHOULDER INJURY, INITIAL ENCOUNTER: ICD-10-CM

## 2024-08-08 DIAGNOSIS — M25.511 RIGHT SHOULDER PAIN, UNSPECIFIED CHRONICITY: ICD-10-CM

## 2024-08-08 PROCEDURE — 73221 MRI JOINT UPR EXTREM W/O DYE: CPT

## 2024-08-20 ENCOUNTER — OFFICE VISIT (OUTPATIENT)
Dept: ORTHOPEDIC SURGERY | Facility: CLINIC | Age: 64
End: 2024-08-20
Payer: COMMERCIAL

## 2024-08-20 VITALS
OXYGEN SATURATION: 94 % | WEIGHT: 246 LBS | SYSTOLIC BLOOD PRESSURE: 142 MMHG | DIASTOLIC BLOOD PRESSURE: 75 MMHG | HEIGHT: 72 IN | HEART RATE: 84 BPM | BODY MASS INDEX: 33.32 KG/M2

## 2024-08-20 DIAGNOSIS — M19.011 OSTEOARTHRITIS OF RIGHT SHOULDER, UNSPECIFIED OSTEOARTHRITIS TYPE: ICD-10-CM

## 2024-08-20 DIAGNOSIS — M75.121 COMPLETE TEAR OF RIGHT ROTATOR CUFF, UNSPECIFIED WHETHER TRAUMATIC: Primary | ICD-10-CM

## 2024-08-20 DIAGNOSIS — M25.511 RIGHT SHOULDER PAIN, UNSPECIFIED CHRONICITY: ICD-10-CM

## 2024-08-20 RX ORDER — DICLOFENAC SODIUM 75 MG/1
75 TABLET, DELAYED RELEASE ORAL 2 TIMES DAILY
Qty: 60 TABLET | Refills: 1 | Status: SHIPPED | OUTPATIENT
Start: 2024-08-20

## 2024-08-20 NOTE — PROGRESS NOTES
"Chief Complaint  Follow-up of the Right Shoulder     Subjective      Agus Church presents to [facility] for follow up of the right shoulder.  He had a MRI on 8/8/24 and is here to review,  He was trying to pry a bar apart and his right shoulder popped. He has been going to the gym and tried to do gentle exercises but is still having pain in the right shoulder. The injury was a couple of months ago.  He has more pain with activities away from the body.  Prior to this he had aches and pains but not to the point he couldn't lift a gallon of milk in the fridge.     No Known Allergies     Social History     Socioeconomic History    Marital status:    Tobacco Use    Smoking status: Never    Smokeless tobacco: Never   Vaping Use    Vaping status: Never Used   Substance and Sexual Activity    Alcohol use: Yes     Comment: weekends and special occ    Drug use: Never    Sexual activity: Defer        I reviewed the patient's chief complaint, history of present illness, review of systems, past medical history, surgical history, family history, social history, medications, and allergy list.     Review of Systems     Constitutional: Denies fevers, chills, weight loss  Cardiovascular: Denies chest pain, shortness of breath  Skin: Denies rashes, acute skin changes  Neurologic: Denies headache, loss of consciousness        Vital Signs:   /75   Pulse 84   Ht 182.9 cm (72\")   Wt 112 kg (246 lb)   SpO2 94%   BMI 33.36 kg/m²          Physical Exam  General: Alert. No acute distress    Ortho Exam        RIGHT SHOULDER Forward flexion 170 with pain. Abduction 100. External rotation 60. Internal rotation L3. Positive Cross body adduction. Supraspinatus strength 4+/5. Infraspinatus Strength 5/5. Infrared subscap 5/5. Negative Mondragon. Negative Neer. Negative Apprehension. Positive Lift off. (Negative Obriens. Sensation intact to light touch, median, radial, ulnar nerve. Positive AIN, PIN, " ulnar nerve motor. Positive pulses. Negative Impingement signs. Good strength in triceps, biceps, deltoid, wrist extensors and wrist flexors. Tender to palpation to the anterior aspect of the shoulder and pain with internal and eternal rotation.  Positive Empty Can test.        Procedures      Imaging Results (Most Recent)       None             Result Review :         MRI Shoulder Right Without Contrast    Result Date: 8/9/2024  Narrative: MRI SHOULDER RIGHT WO CONTRAST-  HISTORY:right shoulder pain; M25.511-Pain in right shoulder; S49.91XA-Unspecified injury of right shoulder and upper arm, initial encounter  TECHNIQUE: MRI right shoulder includes axial PD as well as oblique coronal PD, T1, T2 fat-sat and oblique sagittal T2 weighted sequences through the right shoulder without contrast.  COMPARISON: Right scapular x-ray 07/25/2024.  FINDINGS: Complete supraspinatus and infraspinatus tendon insertional tears. There are torn, frayed subscapularis tendon fibers retracted to the level of the glenohumeral joint and infraspinatus tendon fibers are retracted to the level the top of the humeral head. No disproportionate muscular atrophy. Intact subscapularis, teres minor tendons.  Humeral head is high riding approaching the undersurface of the acromion and there is an acromial spur. Advanced AC joint arthritis with bony overgrowth and capsular thickening at the AC joint. Glenohumeral joint effusion with fluid distention subacromial/subdeltoid bursa.  Mild glenohumeral joint arthritis with articular cartilage thinning at the superior glenoid. Anterior-superior sublabral foramen. No evidence for glenohumeral ligament injury.      Impression: 1. Complete supraspinatus and infraspinatus tendon insertional tears with retraction. 2. Advanced AC joint arthritis with acromial spur. High riding humeral head. 3. Mild glenohumeral joint arthritis with cartilage thinning superior glenoid.  This report was finalized on 8/9/2024 7:57  AM by Deep Gresham M.D on Workstation: [id number]      XR Scapula Right    Result Date: 7/25/2024  Narrative:  X-Ray Report: Right scapula X-Ray Indication: Evaluation of the right scapula AP/Lateral view(s) Findings: Moderate AC joint arthritis. No acute fracture or dislocation. Prior studies available for comparison: no             Assessment and Plan     Diagnoses and all orders for this visit:    1. Right shoulder pain, unspecified chronicity (Primary)    2. Osteoarthritis of right shoulder, unspecified osteoarthritis type    3. Complete tear of right rotator cuff, unspecified whether traumatic        Discussed the treatment plan with the patient. I reviewed the MRI results with the patient.     Discussed the treatment options with the patient, operative vs non-operative.     Prescribed anti inflammatory.  Prescribed physical therapy.     Call or return if worsening symptoms.    Follow Up     4-6 weeks to assess if therapy is helping vs surgical intervention       Patient was given instructions and counseling regarding his condition or for health maintenance advice. Please see specific information pulled into the AVS if appropriate.     Scribed for Jacqueline Jack MD by Kelsy Lucero MA.  08/20/24   08:19 EDT    I have personally performed the services described in this document as scribed by the above individual and it is both accurate and complete. Jacqueline Jack MD 08/22/24

## 2024-09-17 ENCOUNTER — PREP FOR SURGERY (OUTPATIENT)
Dept: OTHER | Facility: HOSPITAL | Age: 64
End: 2024-09-17
Payer: COMMERCIAL

## 2024-09-17 ENCOUNTER — OFFICE VISIT (OUTPATIENT)
Dept: ORTHOPEDIC SURGERY | Facility: CLINIC | Age: 64
End: 2024-09-17
Payer: COMMERCIAL

## 2024-09-17 VITALS — HEIGHT: 72 IN | WEIGHT: 246 LBS | BODY MASS INDEX: 33.32 KG/M2

## 2024-09-17 DIAGNOSIS — M75.121 COMPLETE TEAR OF RIGHT ROTATOR CUFF, UNSPECIFIED WHETHER TRAUMATIC: Primary | ICD-10-CM

## 2024-09-17 DIAGNOSIS — Z01.818 PREOP EXAMINATION: Primary | ICD-10-CM

## 2024-09-17 RX ORDER — TRANEXAMIC ACID 10 MG/ML
1000 INJECTION, SOLUTION INTRAVENOUS ONCE
OUTPATIENT
Start: 2024-09-17 | End: 2024-09-17

## 2024-09-18 PROBLEM — Z01.818 PREOP EXAMINATION: Status: ACTIVE | Noted: 2024-09-17

## 2024-09-19 ENCOUNTER — TELEPHONE (OUTPATIENT)
Dept: ORTHOPEDIC SURGERY | Facility: CLINIC | Age: 64
End: 2024-09-19
Payer: COMMERCIAL

## 2024-10-01 DIAGNOSIS — Z96.611 AFTERCARE FOLLOWING RIGHT SHOULDER JOINT REPLACEMENT SURGERY: Primary | ICD-10-CM

## 2024-10-01 DIAGNOSIS — Z47.1 AFTERCARE FOLLOWING RIGHT SHOULDER JOINT REPLACEMENT SURGERY: Primary | ICD-10-CM

## 2024-10-16 ENCOUNTER — TELEPHONE (OUTPATIENT)
Dept: ORTHOPEDIC SURGERY | Facility: CLINIC | Age: 64
End: 2024-10-16
Payer: COMMERCIAL

## 2024-10-28 ENCOUNTER — PRE-ADMISSION TESTING (OUTPATIENT)
Dept: PREADMISSION TESTING | Facility: HOSPITAL | Age: 64
End: 2024-10-28
Payer: COMMERCIAL

## 2024-10-28 VITALS
WEIGHT: 251.1 LBS | HEIGHT: 72 IN | SYSTOLIC BLOOD PRESSURE: 138 MMHG | OXYGEN SATURATION: 95 % | BODY MASS INDEX: 34.01 KG/M2 | HEART RATE: 72 BPM | TEMPERATURE: 97.8 F | DIASTOLIC BLOOD PRESSURE: 72 MMHG | RESPIRATION RATE: 16 BRPM

## 2024-10-28 DIAGNOSIS — Z01.818 PREOP EXAMINATION: ICD-10-CM

## 2024-10-28 LAB
ALBUMIN SERPL-MCNC: 4 G/DL (ref 3.5–5.2)
ALBUMIN/GLOB SERPL: 1.3 G/DL
ALP SERPL-CCNC: 101 U/L (ref 39–117)
ALT SERPL W P-5'-P-CCNC: 26 U/L (ref 1–41)
ANION GAP SERPL CALCULATED.3IONS-SCNC: 11.7 MMOL/L (ref 5–15)
AST SERPL-CCNC: 22 U/L (ref 1–40)
BASOPHILS # BLD AUTO: 0.03 10*3/MM3 (ref 0–0.2)
BASOPHILS NFR BLD AUTO: 0.6 % (ref 0–1.5)
BILIRUB SERPL-MCNC: 0.5 MG/DL (ref 0–1.2)
BUN SERPL-MCNC: 14 MG/DL (ref 8–23)
BUN/CREAT SERPL: 13.9 (ref 7–25)
CALCIUM SPEC-SCNC: 9.5 MG/DL (ref 8.6–10.5)
CHLORIDE SERPL-SCNC: 101 MMOL/L (ref 98–107)
CO2 SERPL-SCNC: 22.3 MMOL/L (ref 22–29)
CREAT SERPL-MCNC: 1.01 MG/DL (ref 0.76–1.27)
DEPRECATED RDW RBC AUTO: 43.1 FL (ref 37–54)
EGFRCR SERPLBLD CKD-EPI 2021: 82.5 ML/MIN/1.73
EOSINOPHIL # BLD AUTO: 0.19 10*3/MM3 (ref 0–0.4)
EOSINOPHIL NFR BLD AUTO: 3.8 % (ref 0.3–6.2)
ERYTHROCYTE [DISTWIDTH] IN BLOOD BY AUTOMATED COUNT: 13.2 % (ref 12.3–15.4)
GLOBULIN UR ELPH-MCNC: 3 GM/DL
GLUCOSE SERPL-MCNC: 187 MG/DL (ref 65–99)
HBA1C MFR BLD: 6.2 % (ref 4.8–5.6)
HCT VFR BLD AUTO: 43.2 % (ref 37.5–51)
HGB BLD-MCNC: 14.4 G/DL (ref 13–17.7)
IMM GRANULOCYTES # BLD AUTO: 0.02 10*3/MM3 (ref 0–0.05)
IMM GRANULOCYTES NFR BLD AUTO: 0.4 % (ref 0–0.5)
LYMPHOCYTES # BLD AUTO: 1.37 10*3/MM3 (ref 0.7–3.1)
LYMPHOCYTES NFR BLD AUTO: 27.5 % (ref 19.6–45.3)
MCH RBC QN AUTO: 30.1 PG (ref 26.6–33)
MCHC RBC AUTO-ENTMCNC: 33.3 G/DL (ref 31.5–35.7)
MCV RBC AUTO: 90.4 FL (ref 79–97)
MONOCYTES # BLD AUTO: 0.37 10*3/MM3 (ref 0.1–0.9)
MONOCYTES NFR BLD AUTO: 7.4 % (ref 5–12)
NEUTROPHILS NFR BLD AUTO: 3.01 10*3/MM3 (ref 1.7–7)
NEUTROPHILS NFR BLD AUTO: 60.3 % (ref 42.7–76)
NRBC BLD AUTO-RTO: 0 /100 WBC (ref 0–0.2)
PLATELET # BLD AUTO: 164 10*3/MM3 (ref 140–450)
PMV BLD AUTO: 9.7 FL (ref 6–12)
POTASSIUM SERPL-SCNC: 4.1 MMOL/L (ref 3.5–5.2)
PROT SERPL-MCNC: 7 G/DL (ref 6–8.5)
RBC # BLD AUTO: 4.78 10*6/MM3 (ref 4.14–5.8)
SODIUM SERPL-SCNC: 135 MMOL/L (ref 136–145)
WBC NRBC COR # BLD AUTO: 4.99 10*3/MM3 (ref 3.4–10.8)

## 2024-10-28 PROCEDURE — 85025 COMPLETE CBC W/AUTO DIFF WBC: CPT

## 2024-10-28 PROCEDURE — 80053 COMPREHEN METABOLIC PANEL: CPT

## 2024-10-28 PROCEDURE — 36415 COLL VENOUS BLD VENIPUNCTURE: CPT

## 2024-10-28 PROCEDURE — 83036 HEMOGLOBIN GLYCOSYLATED A1C: CPT

## 2024-10-28 NOTE — DISCHARGE INSTRUCTIONS
IMPORTANT INSTRUCTIONS - PRE-ADMISSION TESTING  DO NOT EAT OR CHEW anything after midnight the night before your procedure.    You may have CLEAR liquids up to ____3__ hours prior to ARRIVAL time. INCLUDES GATORADE, 20 OZ  NO RED  Take the following medications the morning of your procedure with JUST A SIP OF WATER:  ALLOPURINOL, ATORVASTATIN, CLARITAN, ESCITALOPRAM_______________________________________________________________________________________________________________________________________________________________________________________    DO NOT BRING your medications to the hospital with you, UNLESS something has changed since your PRE-Admission Testing appointment.  Hold all vitamins, supplements, and NSAIDS (Non- steroidal anti-inflammatory meds) for one week prior to surgery (you MAY take Tylenol or Acetaminophen). LAST DOSE 10/28/24  If you are diabetic, check your blood sugar the morning of your procedure. If it is less than 70 or if you are feeling symptomatic, call the following number for further instructions: [phone] SAME DAY SURGERY FRIDAY, 11/1/24______.  Use your inhalers/nebulizers as usual, the morning of your procedure. BRING YOUR INHALERS with you. NA  Bring your CPAP or BIPAP to hospital, ONLY IF YOU WILL BE SPENDING THE NIGHT. NA  Make sure you have a ride home and have someone who will stay with you the day of your procedure after you go home.  If you have any questions, please call your Pre-Admission Testing Nurse, __________SOL______ at [phone]___________.   Per anesthesia request, do not smoke for 24 hours before your procedure or as instructed by your surgeon.        PREOPERATIVE (BEFORE SURGERY)              BATHING INSTRUCTIONS  Instructions:    You will need to shower 3 separate times utilizing the soap provided; at the times indicated   below:     - PM 11/2/24  - AM  AND PM  11/3/24  -      Wash your hair and face with normal shampoo and soap, rinse it well  before using the surgical soap.      In the shower, wet the skin completely with water from your neck to your feet. Apply the cleanser to your   body ONLY FROM THE NECK TO YOUR FEET.     Do NOT USE THE CLEANSER ON YOUR FACE, HEAD, OR GENITAL (PRIVATE) AREAS.   Keep it out of your eyes, ears, and mouth because of the risk of injury to those areas.      Scrub with a clean washcloth for each bath utilizing the soap provided from the top of your body to the   bottom starting at the neck area.      Pay close attention to your armpits, groin area, and the site of surgery.      Wash your body gently for 5 minutes. Stand outside the stream or turn off the water while scrubbing your   body. Do NOT wash with your regular soap after the surgical cleanser is used.      RINSE THE CLEANSER OFF COMPLETELY with plenty of water. Rinse the area again thoroughly.      Dry off with a clean towel. The surgical soap can cause dryness; however do NOT APPLY LOTION,   CREAM, POWDER, and/or DEODORANT AFTER SHOWERING.     Be sure to where clean clothes after showering.      Ensure CLEAN BED LINENS AFTER FIRST wash with the surgical soap.      NO PETS ALLOWED IN THE BED with you after utilizing the surgical soap.

## 2024-10-28 NOTE — SIGNIFICANT NOTE
EDUCATIONAL MATERIALS/SURGICAL SOAP & INSTRUCTIONS/COLD THERAPY WRAP VIDEO AND SHOULDER ABDUCTION SLING ALL REVIEWED WITH PT . WRITTEN INSTRUCTIONS AND ERAS INFORMATION GIVEN TO PT. PT VERBALIZED UNDERSTANDING.

## 2024-10-30 ENCOUNTER — ANESTHESIA EVENT (OUTPATIENT)
Dept: PERIOP | Facility: HOSPITAL | Age: 64
End: 2024-10-30
Payer: MEDICARE

## 2024-11-02 NOTE — H&P
"Chief Complaint  No chief complaint on file.    Subjective      Agus Church presents to [facility] for follow-up of right shoulder.  Patient had an MRI which revealed a full-thickness retracted rotator cuff tear with advanced AC joint arthritis, high riding humeral head and mild glenohumeral joint arthritis with cartilage thinning superior glenoid.  Patient has been attending physical therapy for several weeks and has noticed little improvement.  Reports that they have also done dry needling and KT taping.  He is attending physical therapy with PT practices.  Reports that the shoulder \"still hurts\".  He has good range of motion, however he continues to have pain.  He is here today to discuss further options.    Objective   Allergies   Allergen Reactions    Ace Inhibitors Cough       Vital Signs:   There were no vitals taken for this visit.      Physical Exam    Constitutional: Awake, alert. Well nourished appearance.    Integumentary: Warm, dry, intact. No obvious rashes.    HENT: Atraumatic, normocephalic.   Respiratory: Non labored respirations .   Cardiovascular: Intact peripheral pulses.    Psychiatric: Normal mood and affect. A&O X3    Ortho Exam  Right shoulder: Active range of motion forward shoulder flexion 175, abduction 160, internal rotation to lower lumbar, external rotation 60 degrees.  Visible catch in the shoulder.  Tender to palpation.  Negative [place]boro's.  Positive liftoff.  Negative Mondragon.  Negative Neer's.  Negative apprehension.  Negative impingement signs.  Full range of motion of the elbow and the wrist.  Neurovascular intact.  Sensation intact.    Imaging Results (Most Recent)       None                      Assessment and Plan   Problem List Items Addressed This Visit    None        Follow Up   No follow-ups on file.    Patient is a non-smoker, did not discuss options for smoking cessation.     Social History     Socioeconomic History    Marital status:  " "  Tobacco Use    Smoking status: Never    Smokeless tobacco: Never   Vaping Use    Vaping status: Never Used   Substance and Sexual Activity    Alcohol use: Yes     Comment: weekends and special occ    Drug use: Never    Sexual activity: Defer       Patient Instructions   Reviewed MRI results.  Discussed treatment plan of care with patient.  Dr. Jack had previously discussed a total reverse shoulder arthroplasty with patient due to nonrepairable rotator cuff tear.  Discussed treatment plan of care with Dr. Jack.  Dr. Jack in agreement with [facility].  Discussed continued conservative measures with patient, however he is ready to \"get it fixed\".    Discussed the risk, benefits and alternatives of right total reverse shoulder arthroplasty with patient.  Discussed the expected recovery course, expectations after surgery to include limited motion and feeling good with the shoulder to be approximately 6 to 8 months postop.  Will continue to have improvements up to a year after surgery.  Discussed potential complications of surgery.  Patient verbalizes understanding and has no further questions at this time.  Dr. Jack was brought to bedside to discuss the procedure with the patient as well.  Patient would like to proceed with scheduling.    Electronically signed by Jacqueline Jack MD, 11/02/24, 9:28 AM EDT.\     "

## 2024-11-04 ENCOUNTER — ANESTHESIA EVENT CONVERTED (OUTPATIENT)
Dept: ANESTHESIOLOGY | Facility: HOSPITAL | Age: 64
End: 2024-11-04
Payer: MEDICARE

## 2024-11-04 ENCOUNTER — HOSPITAL ENCOUNTER (OUTPATIENT)
Facility: HOSPITAL | Age: 64
Discharge: HOME | End: 2024-11-05
Attending: ORTHOPAEDIC SURGERY | Admitting: ORTHOPAEDIC SURGERY
Payer: MEDICARE

## 2024-11-04 ENCOUNTER — APPOINTMENT (OUTPATIENT)
Dept: GENERAL RADIOLOGY | Facility: HOSPITAL | Age: 64
End: 2024-11-04
Payer: MEDICARE

## 2024-11-04 ENCOUNTER — ANESTHESIA (OUTPATIENT)
Dept: PERIOP | Facility: HOSPITAL | Age: 64
End: 2024-11-04
Payer: MEDICARE

## 2024-11-04 DIAGNOSIS — Z01.818 PREOP EXAMINATION: ICD-10-CM

## 2024-11-04 DIAGNOSIS — R26.2 DIFFICULTY IN WALKING: ICD-10-CM

## 2024-11-04 DIAGNOSIS — Z96.611 STATUS POST REVERSE TOTAL REPLACEMENT OF RIGHT SHOULDER: Primary | ICD-10-CM

## 2024-11-04 PROBLEM — Z96.619 S/P REVERSE TOTAL SHOULDER ARTHROPLASTY: Status: ACTIVE | Noted: 2024-11-04

## 2024-11-04 PROCEDURE — 25010000002 MIDAZOLAM PER 1MG: Performed by: STUDENT IN AN ORGANIZED HEALTH CARE EDUCATION/TRAINING PROGRAM

## 2024-11-04 PROCEDURE — C1776 JOINT DEVICE (IMPLANTABLE): HCPCS | Performed by: ORTHOPAEDIC SURGERY

## 2024-11-04 PROCEDURE — 25010000002 PROPOFOL 200 MG/20ML EMULSION

## 2024-11-04 PROCEDURE — 25010000002 ROPIVACAINE PER 1 MG: Performed by: ANESTHESIOLOGY

## 2024-11-04 PROCEDURE — 25010000002 ONDANSETRON PER 1 MG

## 2024-11-04 PROCEDURE — 25010000002 CEFAZOLIN PER 500 MG

## 2024-11-04 PROCEDURE — 25010000002 CEFAZOLIN PER 500 MG: Performed by: ORTHOPAEDIC SURGERY

## 2024-11-04 PROCEDURE — 25010000002 DEXAMETHASONE PER 1 MG

## 2024-11-04 PROCEDURE — 25010000002 FENTANYL CITRATE (PF) 50 MCG/ML SOLUTION

## 2024-11-04 PROCEDURE — 73020 X-RAY EXAM OF SHOULDER: CPT

## 2024-11-04 PROCEDURE — 25010000002 LIDOCAINE PF 2% 2 % SOLUTION

## 2024-11-04 PROCEDURE — L3670 SO ACRO/CLAV CAN WEB PRE OTS: HCPCS | Performed by: ORTHOPAEDIC SURGERY

## 2024-11-04 PROCEDURE — 25810000003 LACTATED RINGERS PER 1000 ML: Performed by: STUDENT IN AN ORGANIZED HEALTH CARE EDUCATION/TRAINING PROGRAM

## 2024-11-04 PROCEDURE — 23472 RECONSTRUCT SHOULDER JOINT: CPT | Performed by: ORTHOPAEDIC SURGERY

## 2024-11-04 PROCEDURE — 94761 N-INVAS EAR/PLS OXIMETRY MLT: CPT

## 2024-11-04 PROCEDURE — 25010000002 HYDROMORPHONE 1 MG/ML SOLUTION

## 2024-11-04 PROCEDURE — 25010000002 MIDAZOLAM PER 1MG

## 2024-11-04 PROCEDURE — 25010000002 SUGAMMADEX 200 MG/2ML SOLUTION

## 2024-11-04 PROCEDURE — 94799 UNLISTED PULMONARY SVC/PX: CPT

## 2024-11-04 DEVICE — TOTL SHLDER REV: Type: IMPLANTABLE DEVICE | Site: SHOULDER | Status: FUNCTIONAL

## 2024-11-04 DEVICE — TRY HUM/SHLDR COMPREHENSIVE/REVERSE MINI COCR STD 40MM: Type: IMPLANTABLE DEVICE | Site: SHOULDER | Status: FUNCTIONAL

## 2024-11-04 DEVICE — BEAR HUM PROLNG PLS3 36MM: Type: IMPLANTABLE DEVICE | Site: SHOULDER | Status: FUNCTIONAL

## 2024-11-04 DEVICE — SCRW COMPRNSV CNTRL 6.5X35MM REUS: Type: IMPLANTABLE DEVICE | Site: SHOULDER | Status: FUNCTIONAL

## 2024-11-04 DEVICE — BASEPLT GLEN COMPR MINI W TPR ADAPTR 25: Type: IMPLANTABLE DEVICE | Site: SHOULDER | Status: FUNCTIONAL

## 2024-11-04 DEVICE — STEM HUM/SHLDR COMPREHENSIVE MINI 12X83MM: Type: IMPLANTABLE DEVICE | Site: SHOULDER | Status: FUNCTIONAL

## 2024-11-04 DEVICE — GLENOSPHERE VERSA DIAL FIX STD 36MM: Type: IMPLANTABLE DEVICE | Site: SHOULDER | Status: FUNCTIONAL

## 2024-11-04 DEVICE — SCRW FIX LK HEX 4.75X15MM: Type: IMPLANTABLE DEVICE | Site: SHOULDER | Status: FUNCTIONAL

## 2024-11-04 DEVICE — SCRW FIX LK HEX 4.75X30MM: Type: IMPLANTABLE DEVICE | Site: SHOULDER | Status: FUNCTIONAL

## 2024-11-04 RX ORDER — MIDAZOLAM HYDROCHLORIDE 2 MG/2ML
2 INJECTION, SOLUTION INTRAMUSCULAR; INTRAVENOUS ONCE
Status: DISCONTINUED | OUTPATIENT
Start: 2024-11-04 | End: 2024-11-04 | Stop reason: SDUPTHER

## 2024-11-04 RX ORDER — ACETAMINOPHEN 325 MG/1
325 TABLET ORAL EVERY 4 HOURS PRN
Status: DISCONTINUED | OUTPATIENT
Start: 2024-11-04 | End: 2024-11-05 | Stop reason: HOSPADM

## 2024-11-04 RX ORDER — LORATADINE 10 MG/1
10 CAPSULE, LIQUID FILLED ORAL DAILY
COMMUNITY

## 2024-11-04 RX ORDER — TRANEXAMIC ACID 10 MG/ML
1000 INJECTION, SOLUTION INTRAVENOUS ONCE
Status: COMPLETED | OUTPATIENT
Start: 2024-11-04 | End: 2024-11-04

## 2024-11-04 RX ORDER — SODIUM CHLORIDE 0.9 % (FLUSH) 0.9 %
10 SYRINGE (ML) INJECTION AS NEEDED
Status: DISCONTINUED | OUTPATIENT
Start: 2024-11-04 | End: 2024-11-05 | Stop reason: HOSPADM

## 2024-11-04 RX ORDER — MAGNESIUM HYDROXIDE 1200 MG/15ML
LIQUID ORAL AS NEEDED
Status: DISCONTINUED | OUTPATIENT
Start: 2024-11-04 | End: 2024-11-04 | Stop reason: HOSPADM

## 2024-11-04 RX ORDER — SODIUM CHLORIDE, SODIUM LACTATE, POTASSIUM CHLORIDE, CALCIUM CHLORIDE 600; 310; 30; 20 MG/100ML; MG/100ML; MG/100ML; MG/100ML
100 INJECTION, SOLUTION INTRAVENOUS CONTINUOUS
Status: DISCONTINUED | OUTPATIENT
Start: 2024-11-04 | End: 2024-11-05 | Stop reason: HOSPADM

## 2024-11-04 RX ORDER — ONDANSETRON 2 MG/ML
4 INJECTION INTRAMUSCULAR; INTRAVENOUS ONCE AS NEEDED
Status: DISCONTINUED | OUTPATIENT
Start: 2024-11-04 | End: 2024-11-04 | Stop reason: HOSPADM

## 2024-11-04 RX ORDER — KETAMINE HCL IN NACL, ISO-OSM 100MG/10ML
SYRINGE (ML) INJECTION AS NEEDED
Status: DISCONTINUED | OUTPATIENT
Start: 2024-11-04 | End: 2024-11-04 | Stop reason: SURG

## 2024-11-04 RX ORDER — PROMETHAZINE HYDROCHLORIDE 25 MG/1
25 SUPPOSITORY RECTAL ONCE AS NEEDED
Status: DISCONTINUED | OUTPATIENT
Start: 2024-11-04 | End: 2024-11-04 | Stop reason: HOSPADM

## 2024-11-04 RX ORDER — MIDAZOLAM HYDROCHLORIDE 2 MG/2ML
2 INJECTION, SOLUTION INTRAMUSCULAR; INTRAVENOUS ONCE
Status: COMPLETED | OUTPATIENT
Start: 2024-11-04 | End: 2024-11-04

## 2024-11-04 RX ORDER — SODIUM CHLORIDE 9 MG/ML
40 INJECTION, SOLUTION INTRAVENOUS AS NEEDED
Status: DISCONTINUED | OUTPATIENT
Start: 2024-11-04 | End: 2024-11-05 | Stop reason: HOSPADM

## 2024-11-04 RX ORDER — ACETAMINOPHEN 500 MG
1000 TABLET ORAL ONCE
Status: COMPLETED | OUTPATIENT
Start: 2024-11-04 | End: 2024-11-04

## 2024-11-04 RX ORDER — FENTANYL CITRATE 50 UG/ML
INJECTION, SOLUTION INTRAMUSCULAR; INTRAVENOUS AS NEEDED
Status: DISCONTINUED | OUTPATIENT
Start: 2024-11-04 | End: 2024-11-04 | Stop reason: SURG

## 2024-11-04 RX ORDER — PHENYLEPHRINE HCL IN 0.9% NACL 0.5 MG/5ML
SYRINGE (ML) INTRAVENOUS AS NEEDED
Status: DISCONTINUED | OUTPATIENT
Start: 2024-11-04 | End: 2024-11-04 | Stop reason: SURG

## 2024-11-04 RX ORDER — EPHEDRINE SULFATE 50 MG/ML
INJECTION INTRAVENOUS AS NEEDED
Status: DISCONTINUED | OUTPATIENT
Start: 2024-11-04 | End: 2024-11-04 | Stop reason: SURG

## 2024-11-04 RX ORDER — ONDANSETRON 2 MG/ML
4 INJECTION INTRAMUSCULAR; INTRAVENOUS EVERY 6 HOURS PRN
Status: DISCONTINUED | OUTPATIENT
Start: 2024-11-04 | End: 2024-11-05 | Stop reason: HOSPADM

## 2024-11-04 RX ORDER — OXYCODONE HYDROCHLORIDE 5 MG/1
5 TABLET ORAL
Status: DISCONTINUED | OUTPATIENT
Start: 2024-11-04 | End: 2024-11-04 | Stop reason: HOSPADM

## 2024-11-04 RX ORDER — MORPHINE SULFATE 2 MG/ML
2 INJECTION, SOLUTION INTRAMUSCULAR; INTRAVENOUS
Status: DISCONTINUED | OUTPATIENT
Start: 2024-11-04 | End: 2024-11-05 | Stop reason: HOSPADM

## 2024-11-04 RX ORDER — AMOXICILLIN 250 MG
2 CAPSULE ORAL 2 TIMES DAILY
Status: DISCONTINUED | OUTPATIENT
Start: 2024-11-04 | End: 2024-11-05 | Stop reason: HOSPADM

## 2024-11-04 RX ORDER — SODIUM CHLORIDE 0.9 % (FLUSH) 0.9 %
10 SYRINGE (ML) INJECTION EVERY 12 HOURS SCHEDULED
Status: DISCONTINUED | OUTPATIENT
Start: 2024-11-04 | End: 2024-11-05 | Stop reason: HOSPADM

## 2024-11-04 RX ORDER — ACETAMINOPHEN 650 MG/1
650 SUPPOSITORY RECTAL EVERY 4 HOURS PRN
Status: DISCONTINUED | OUTPATIENT
Start: 2024-11-04 | End: 2024-11-05 | Stop reason: HOSPADM

## 2024-11-04 RX ORDER — ONDANSETRON 2 MG/ML
INJECTION INTRAMUSCULAR; INTRAVENOUS AS NEEDED
Status: DISCONTINUED | OUTPATIENT
Start: 2024-11-04 | End: 2024-11-04 | Stop reason: SURG

## 2024-11-04 RX ORDER — MEPERIDINE HYDROCHLORIDE 25 MG/ML
12.5 INJECTION INTRAMUSCULAR; INTRAVENOUS; SUBCUTANEOUS
Status: DISCONTINUED | OUTPATIENT
Start: 2024-11-04 | End: 2024-11-04 | Stop reason: HOSPADM

## 2024-11-04 RX ORDER — PROMETHAZINE HYDROCHLORIDE 12.5 MG/1
25 TABLET ORAL ONCE AS NEEDED
Status: DISCONTINUED | OUTPATIENT
Start: 2024-11-04 | End: 2024-11-04 | Stop reason: HOSPADM

## 2024-11-04 RX ORDER — ROPIVACAINE HYDROCHLORIDE 5 MG/ML
INJECTION, SOLUTION EPIDURAL; INFILTRATION; PERINEURAL
Status: COMPLETED | OUTPATIENT
Start: 2024-11-04 | End: 2024-11-04

## 2024-11-04 RX ORDER — PROPOFOL 10 MG/ML
INJECTION, EMULSION INTRAVENOUS AS NEEDED
Status: DISCONTINUED | OUTPATIENT
Start: 2024-11-04 | End: 2024-11-04 | Stop reason: SURG

## 2024-11-04 RX ORDER — NALOXONE HCL 0.4 MG/ML
0.4 VIAL (ML) INJECTION
Status: DISCONTINUED | OUTPATIENT
Start: 2024-11-04 | End: 2024-11-05 | Stop reason: HOSPADM

## 2024-11-04 RX ORDER — HYDROCODONE BITARTRATE AND ACETAMINOPHEN 7.5; 325 MG/1; MG/1
2 TABLET ORAL EVERY 4 HOURS PRN
Status: DISCONTINUED | OUTPATIENT
Start: 2024-11-04 | End: 2024-11-05 | Stop reason: HOSPADM

## 2024-11-04 RX ORDER — MIDAZOLAM HYDROCHLORIDE 2 MG/2ML
INJECTION, SOLUTION INTRAMUSCULAR; INTRAVENOUS
Status: COMPLETED
Start: 2024-11-04 | End: 2024-11-04

## 2024-11-04 RX ORDER — ONDANSETRON 4 MG/1
4 TABLET, ORALLY DISINTEGRATING ORAL EVERY 6 HOURS PRN
Status: DISCONTINUED | OUTPATIENT
Start: 2024-11-04 | End: 2024-11-05 | Stop reason: HOSPADM

## 2024-11-04 RX ORDER — ROCURONIUM BROMIDE 10 MG/ML
INJECTION, SOLUTION INTRAVENOUS AS NEEDED
Status: DISCONTINUED | OUTPATIENT
Start: 2024-11-04 | End: 2024-11-04 | Stop reason: SURG

## 2024-11-04 RX ORDER — HYDROCODONE BITARTRATE AND ACETAMINOPHEN 7.5; 325 MG/1; MG/1
1 TABLET ORAL EVERY 4 HOURS PRN
Status: DISCONTINUED | OUTPATIENT
Start: 2024-11-04 | End: 2024-11-05 | Stop reason: HOSPADM

## 2024-11-04 RX ORDER — SODIUM CHLORIDE, SODIUM LACTATE, POTASSIUM CHLORIDE, CALCIUM CHLORIDE 600; 310; 30; 20 MG/100ML; MG/100ML; MG/100ML; MG/100ML
9 INJECTION, SOLUTION INTRAVENOUS CONTINUOUS PRN
Status: DISCONTINUED | OUTPATIENT
Start: 2024-11-04 | End: 2024-11-04 | Stop reason: HOSPADM

## 2024-11-04 RX ORDER — ACETAMINOPHEN 325 MG/1
650 TABLET ORAL EVERY 4 HOURS PRN
Status: DISCONTINUED | OUTPATIENT
Start: 2024-11-04 | End: 2024-11-05 | Stop reason: HOSPADM

## 2024-11-04 RX ORDER — DEXAMETHASONE SODIUM PHOSPHATE 4 MG/ML
INJECTION, SOLUTION INTRA-ARTICULAR; INTRALESIONAL; INTRAMUSCULAR; INTRAVENOUS; SOFT TISSUE AS NEEDED
Status: DISCONTINUED | OUTPATIENT
Start: 2024-11-04 | End: 2024-11-04 | Stop reason: SURG

## 2024-11-04 RX ORDER — FERROUS SULFATE 325(65) MG
325 TABLET ORAL
Status: DISCONTINUED | OUTPATIENT
Start: 2024-11-05 | End: 2024-11-05 | Stop reason: HOSPADM

## 2024-11-04 RX ORDER — FAMOTIDINE 20 MG/1
40 TABLET, FILM COATED ORAL DAILY
Status: DISCONTINUED | OUTPATIENT
Start: 2024-11-04 | End: 2024-11-05 | Stop reason: HOSPADM

## 2024-11-04 RX ORDER — LIDOCAINE HYDROCHLORIDE 20 MG/ML
INJECTION, SOLUTION EPIDURAL; INFILTRATION; INTRACAUDAL; PERINEURAL AS NEEDED
Status: DISCONTINUED | OUTPATIENT
Start: 2024-11-04 | End: 2024-11-04 | Stop reason: SURG

## 2024-11-04 RX ADMIN — ACETAMINOPHEN 1000 MG: 500 TABLET ORAL at 11:10

## 2024-11-04 RX ADMIN — SODIUM CHLORIDE, POTASSIUM CHLORIDE, SODIUM LACTATE AND CALCIUM CHLORIDE 9 ML/HR: 600; 310; 30; 20 INJECTION, SOLUTION INTRAVENOUS at 11:00

## 2024-11-04 RX ADMIN — SENNOSIDES AND DOCUSATE SODIUM 2 TABLET: 50; 8.6 TABLET ORAL at 20:19

## 2024-11-04 RX ADMIN — FENTANYL CITRATE 100 MCG: 50 INJECTION, SOLUTION INTRAMUSCULAR; INTRAVENOUS at 12:51

## 2024-11-04 RX ADMIN — MIDAZOLAM HYDROCHLORIDE 2 MG: 1 INJECTION, SOLUTION INTRAMUSCULAR; INTRAVENOUS at 12:07

## 2024-11-04 RX ADMIN — EPHEDRINE SULFATE 10 MG: 50 INJECTION INTRAVENOUS at 13:39

## 2024-11-04 RX ADMIN — SUGAMMADEX 200 MG: 100 INJECTION, SOLUTION INTRAVENOUS at 14:19

## 2024-11-04 RX ADMIN — EPHEDRINE SULFATE 15 MG: 50 INJECTION INTRAVENOUS at 13:11

## 2024-11-04 RX ADMIN — EPHEDRINE SULFATE 10 MG: 50 INJECTION INTRAVENOUS at 13:57

## 2024-11-04 RX ADMIN — MIDAZOLAM HYDROCHLORIDE 2 MG: 1 INJECTION, SOLUTION INTRAMUSCULAR; INTRAVENOUS at 11:52

## 2024-11-04 RX ADMIN — SODIUM CHLORIDE 2000 MG: 9 INJECTION, SOLUTION INTRAVENOUS at 20:19

## 2024-11-04 RX ADMIN — SODIUM CHLORIDE 2 G: 9 INJECTION, SOLUTION INTRAVENOUS at 13:10

## 2024-11-04 RX ADMIN — PROPOFOL 150 MG: 10 INJECTION, EMULSION INTRAVENOUS at 12:52

## 2024-11-04 RX ADMIN — Medication 100 MCG: at 12:56

## 2024-11-04 RX ADMIN — HYDROCODONE BITARTRATE AND ACETAMINOPHEN 1 TABLET: 7.5; 325 TABLET ORAL at 18:10

## 2024-11-04 RX ADMIN — MIDAZOLAM HYDROCHLORIDE 2 MG: 2 INJECTION, SOLUTION INTRAMUSCULAR; INTRAVENOUS at 12:07

## 2024-11-04 RX ADMIN — LIDOCAINE HYDROCHLORIDE 100 MG: 20 INJECTION, SOLUTION EPIDURAL; INFILTRATION; INTRACAUDAL; PERINEURAL at 12:51

## 2024-11-04 RX ADMIN — FAMOTIDINE 40 MG: 20 TABLET ORAL at 17:50

## 2024-11-04 RX ADMIN — DEXAMETHASONE SODIUM PHOSPHATE 4 MG: 4 INJECTION, SOLUTION INTRAMUSCULAR; INTRAVENOUS at 13:30

## 2024-11-04 RX ADMIN — ROPIVACAINE HYDROCHLORIDE 30 ML: 5 INJECTION, SOLUTION EPIDURAL; INFILTRATION; PERINEURAL at 12:16

## 2024-11-04 RX ADMIN — ROCURONIUM BROMIDE 10 MG: 10 INJECTION, SOLUTION INTRAVENOUS at 13:56

## 2024-11-04 RX ADMIN — ROCURONIUM BROMIDE 50 MG: 10 INJECTION, SOLUTION INTRAVENOUS at 12:54

## 2024-11-04 RX ADMIN — Medication 50 MG: at 13:30

## 2024-11-04 RX ADMIN — ONDANSETRON 4 MG: 2 INJECTION INTRAMUSCULAR; INTRAVENOUS at 13:30

## 2024-11-04 RX ADMIN — TRANEXAMIC ACID 1000 MG: 10 INJECTION, SOLUTION INTRAVENOUS at 11:52

## 2024-11-04 RX ADMIN — Medication 100 MCG: at 13:05

## 2024-11-04 RX ADMIN — HYDROMORPHONE HYDROCHLORIDE 0.5 MG: 1 INJECTION, SOLUTION INTRAMUSCULAR; INTRAVENOUS; SUBCUTANEOUS at 13:53

## 2024-11-04 RX ADMIN — Medication 100 MCG: at 13:57

## 2024-11-04 RX ADMIN — TRANEXAMIC ACID 1000 MG: 10 INJECTION, SOLUTION INTRAVENOUS at 14:02

## 2024-11-04 NOTE — OP NOTE
TOTAL SHOULDER REVERSE ARTHROPLASTY  Procedure Report    Patient Name:  Agus Church  YOB: 1960    Date of Surgery:  11/4/2024     Indications:  Severe shoulder arthritis and/or Rotator Cuff Arthropathy, failed conservative treatment    Pre-op Diagnosis:   Right rotator cuff arthropathy       Post op Diagnosis: same    Procedure/CPT® Codes:      Procedure(s):  RIGHT TOTAL SHOULDER REVERSE ARTHROPLASTY    Staff:  Surgeon(s):  Jacqueline Jack MD         Anesthesia: General    Estimated Blood Loss: 50 mL    Implants:    Implant Name Type Inv. Item Serial No.  Lot No. LRB No. Used Action   SCRW FIX LK HEX 4.40A56GP - [id number] Implant SCRW FIX LK HEX 4.15V98HB  [employer] [id number] Right 1 Implanted   SCRW FIX LK HEX 4.45J55GN - [id number] Implant SCRW FIX LK HEX 4.84Y54PJ  [employer] [id number] Right 1 Implanted   BASEPLT ASTON COMPR MINI W TPR ADAPTR 25 - [id number] Implant BASEPLT ASTON COMPR MINI W TPR ADAPTR 25  [employer] [id number] Right 1 Implanted   GLENOSPHERE VERSA DIAL FIX STD 36MM - [id number] Implant GLENOSPHERE VERSA DIAL FIX STD 36MM  [employer] [id number] Right 1 Implanted   SCRW COMPRNSV CNTRL 6.5X35MM REUS - [id number] Implant SCRW COMPRNSV CNTRL 6.5X35MM REUS  [employer] [id number] Right 1 Implanted   SCRW FIX LK HEX 4.88G96MQ - [id number] Implant SCRW FIX LK HEX 4.88W68MQ  [employer] [id number] Right 1 Implanted   SCRW FIX LK HEX 4.09V38EX - [id number] Implant SCRW FIX LK HEX 4.35Q95LK  [employer] [id number] Right 1 Implanted   STEM HUM/SHLDR COMPREHENSIVE MINI 49N20AN - [id number] Implant STEM HUM/SHLDR COMPREHENSIVE MINI [id number]  [employer] [id number] Right 1 Implanted   BEAR HUM PROLNG PLS3 36MM - [id number] Implant BEAR HUM PROLNG PLS3 36MM  [employer] [id number] Right 1 Implanted   TRY HUM/SHLDR COMPREHENSIVE/REVERSE MINI COCR STD 40MM - [id number] Implant TRY HUM/SHLDR COMPREHENSIVE/REVERSE MINI COCR STD 40MM  [employer] [id number] Right 1 Implanted        Specimen:          None        Findings:  OA    Complications: None    Description of Procedure: The patient was brought to the operating room and underwent general anesthesia, preoperative antibiotic and preoperative block, was placed in modified beach chair position, and then prepped and draped in sterile fashion. A deltopectoral incision was made. The interval was found and retractors were placed. The capsule was then opened and then tagged. The shoulder was then dislocated, retractors placed, and this was sequentially reamed, and then the intramedullary cutting guide was placed and the neck cut was made. The head was removed, the pins were removed, and then this was sequentially broached up to the same size as the stem. Glenoid retractors were then placed and then the central pin was then placed. The reamer was then used. Then the mini baseplate was inserted. The central screw hole was drilled and the appropriate screw was then inserted. The peripheral screws were then placed using the locking guide, and these measured and inserted. The glenosphere was packed into place. This was then trial reduced, and a the selected tray and bearing gave good stability. The broach was removed. The mini stem was then inserted followed by placement of the tray and bearing. This was reduced. Excellent range of motion and stability. This was then thoroughly irrigated and then repaired using the [place] needle with the Ethibond stitches and then #1 Vicryl, 2-0 Vicryl, and staples. A sterile dressing was applied followed by ice pack and UltraSling. The patient was then extubated and taken to recovery in stable condition. No complications.          SURGICAL APPROACH: Deltopectoral      SURGICAL TECHNIQUE: Peel off        Jacqueline Jack MD     Date: 11/4/2024  Time: 14:27 EST

## 2024-11-04 NOTE — PLAN OF CARE
Goal Outcome Evaluation:           Progress: improving  Outcome Evaluation: Patient arrived to the floor this evening after a right shoulder reverse arthroplasty. Assist x1 for transfers. Ambulated 10 feet this shift. He has not voided yet. Pain is controlled, not received pain medication this shift from me. Expected discharge is tomorrow. Will use meds to bed. Ride home will be his wife. No DME needs at this time. Will be using [facility] in [place] for outpatient rehab. Wife at bedside.     Neha Mcpherson RN

## 2024-11-04 NOTE — ANESTHESIA POSTPROCEDURE EVALUATION
Patient: Agus Church    Procedure Summary       Date: 11/04/24 Room / Location: [facility] ED OR 03 / [facility] MAIN OR    Anesthesia Start: 1243 Anesthesia Stop: 1436    Procedure: RIGHT TOTAL SHOULDER REVERSE ARTHROPLASTY (Right: Shoulder) Diagnosis:       Preop examination      (Preop examination [Z01.818])    Surgeons: Jacqueline Jack MD Provider: Rojas Stein MD    Anesthesia Type: general with block ASA Status: 2            Anesthesia Type: general with block    Vitals  Vitals Value Taken Time   /90 11/04/24 1720   Temp 36.7 °C (98.1 °F) 11/04/24 1720   Pulse 84 11/04/24 1732   Resp 18 11/04/24 1732   SpO2 96 % 11/04/24 1732           Post Anesthesia Care and Evaluation    Patient location during evaluation: bedside  Patient participation: complete - patient participated  Level of consciousness: awake  Pain management: adequate    Airway patency: patent  PONV Status: none  Cardiovascular status: acceptable and stable  Respiratory status: acceptable  Hydration status: acceptable

## 2024-11-04 NOTE — ANESTHESIA PREPROCEDURE EVALUATION
Anesthesia Evaluation     Patient summary reviewed and Nursing notes reviewed   no history of anesthetic complications:   NPO Solid Status: > 8 hours  NPO Liquid Status: > 2 hours           Airway   Mallampati: I  TM distance: >3 FB  Neck ROM: full  No difficulty expected  Dental - normal exam         Pulmonary - negative pulmonary ROS and normal exam    breath sounds clear to auscultation  Cardiovascular - normal exam  Exercise tolerance: good (4-7 METS)    Rhythm: regular  Rate: normal    (+) hypertension well controlled 2 medications or greater, hyperlipidemia      Neuro/Psych  (+) psychiatric history Anxiety  GI/Hepatic/Renal/Endo    (+) obesity    Musculoskeletal     Abdominal    Substance History - negative use     OB/GYN negative ob/gyn ROS         Other   arthritis,     ROS/Med Hx Other: >4METS, ACTIVE. 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 TJR.KT     Took Candesartan DOS              Anesthesia Plan    ASA 2     general with block     (Patient understands anesthesia not responsible for dental damage. Regional anesthesia options discussed with patient. Pt accepts regional block.)  intravenous induction     Anesthetic plan, risks, benefits, and alternatives have been provided, discussed and informed consent has been obtained with: patient.    Use of blood products discussed with patient .    Plan discussed with CRNA.    CODE STATUS:

## 2024-11-04 NOTE — ANESTHESIA PROCEDURE NOTES
Peripheral Block      Patient reassessed immediately prior to procedure    Patient location during procedure: pre-op  Start time: 11/4/2024 12:13 PM  Stop time: 11/4/2024 12:16 PM  Reason for block: at surgeon's request and post-op pain management  Performed by  Anesthesiologist: Rojas Stein MD  Preanesthetic Checklist  Completed: patient identified, IV checked, site marked, risks and benefits discussed, surgical consent, monitors and equipment checked, pre-op evaluation and timeout performed  Prep:  Pt Position: supine (HOB elevated)  Sterile barriers:cap, washed/disinfected hands, sterile barriers, gloves, mask, partial drape and alcohol skin prep  Prep: ChloraPrep  Patient monitoring: blood pressure monitoring, continuous pulse oximetry and EKG  Procedure    Sedation: yes  Performed under: local infiltration  Guidance:ultrasound guided and nerve stimulator    ULTRASOUND INTERPRETATION.  Using ultrasound guidance a 22 G gauge needle was placed in close proximity to the brachial plexus nerve, at which point, under ultrasound guidance anesthetic was injected in the area of the nerve and spread of the anesthesia was seen on ultrasound in close proximity thereto.  There were no abnormalities seen on ultrasound; a digital image was taken; and the patient tolerated the procedure with no complications. Images:still images obtained, printed/placed on chart    Laterality:right  Block Type:interscalene  Injection Technique:single-shot  Needle Type:echogenic  Needle Gauge:22 G (2in)  Resistance on Injection: none    Medications Used: ropivacaine (NAROPIN) 0.5 % injection - Injection   30 mL - 11/4/2024 12:16:00 PM      Post Assessment  Injection Assessment: negative aspiration for heme, no paresthesia on injection and incremental injection  Patient Tolerance:comfortable throughout block  Complications:no  Additional Notes  The block or continuous infusion is requested by the referring physician for management of  postoperative pain, or pain related to a procedure. Ultrasound guidance (deemed medically necessary). Painless injection, pt was awake and conversant during the procedure without complications. Needle and surrounding structures visualized throughout procedure. No adverse reactions or complications seen during this period. Post-procedure image showed no signs of complication, and anatomy was consistent with an uncomplicated nerve blockade.  Performed by: Rojas Stein MD

## 2024-11-05 ENCOUNTER — TELEPHONE (OUTPATIENT)
Dept: ORTHOPEDIC SURGERY | Facility: CLINIC | Age: 64
End: 2024-11-05
Payer: MEDICARE

## 2024-11-05 VITALS
HEART RATE: 69 BPM | HEIGHT: 72 IN | TEMPERATURE: 97.3 F | SYSTOLIC BLOOD PRESSURE: 114 MMHG | RESPIRATION RATE: 18 BRPM | OXYGEN SATURATION: 92 % | DIASTOLIC BLOOD PRESSURE: 78 MMHG | WEIGHT: 247.58 LBS | BODY MASS INDEX: 33.53 KG/M2

## 2024-11-05 PROBLEM — Z96.619 S/P REVERSE TOTAL SHOULDER ARTHROPLASTY: Status: RESOLVED | Noted: 2024-11-04 | Resolved: 2024-11-05

## 2024-11-05 LAB
ANION GAP SERPL CALCULATED.3IONS-SCNC: 13.8 MMOL/L (ref 5–15)
BUN SERPL-MCNC: 13 MG/DL (ref 8–23)
BUN/CREAT SERPL: 12.6 (ref 7–25)
CALCIUM SPEC-SCNC: 9 MG/DL (ref 8.6–10.5)
CHLORIDE SERPL-SCNC: 99 MMOL/L (ref 98–107)
CO2 SERPL-SCNC: 21.2 MMOL/L (ref 22–29)
CREAT SERPL-MCNC: 1.03 MG/DL (ref 0.76–1.27)
EGFRCR SERPLBLD CKD-EPI 2021: 80.6 ML/MIN/1.73
GLUCOSE SERPL-MCNC: 150 MG/DL (ref 65–99)
HCT VFR BLD AUTO: 40.2 % (ref 37.5–51)
HGB BLD-MCNC: 13.6 G/DL (ref 13–17.7)
POTASSIUM SERPL-SCNC: 4.3 MMOL/L (ref 3.5–5.2)
SODIUM SERPL-SCNC: 134 MMOL/L (ref 136–145)

## 2024-11-05 PROCEDURE — 80048 BASIC METABOLIC PNL TOTAL CA: CPT | Performed by: ORTHOPAEDIC SURGERY

## 2024-11-05 PROCEDURE — 97110 THERAPEUTIC EXERCISES: CPT

## 2024-11-05 PROCEDURE — 85014 HEMATOCRIT: CPT | Performed by: ORTHOPAEDIC SURGERY

## 2024-11-05 PROCEDURE — 97161 PT EVAL LOW COMPLEX 20 MIN: CPT

## 2024-11-05 PROCEDURE — 85018 HEMOGLOBIN: CPT | Performed by: ORTHOPAEDIC SURGERY

## 2024-11-05 PROCEDURE — 25010000002 CEFAZOLIN PER 500 MG: Performed by: ORTHOPAEDIC SURGERY

## 2024-11-05 PROCEDURE — 97535 SELF CARE MNGMENT TRAINING: CPT

## 2024-11-05 PROCEDURE — 97165 OT EVAL LOW COMPLEX 30 MIN: CPT

## 2024-11-05 RX ORDER — ASPIRIN 325 MG
325 TABLET, DELAYED RELEASE (ENTERIC COATED) ORAL DAILY
Status: SHIPPED | OUTPATIENT
Start: 2024-11-05 | End: 2024-11-19

## 2024-11-05 RX ORDER — ASPIRIN 325 MG
325 TABLET, DELAYED RELEASE (ENTERIC COATED) ORAL DAILY
Qty: 14 TABLET | Refills: 0 | Status: SHIPPED | OUTPATIENT
Start: 2024-11-05

## 2024-11-05 RX ORDER — HYDROCODONE BITARTRATE AND ACETAMINOPHEN 7.5; 325 MG/1; MG/1
1-2 TABLET ORAL EVERY 4 HOURS PRN
Qty: 40 TABLET | Refills: 0 | Status: SHIPPED | OUTPATIENT
Start: 2024-11-05 | End: 2024-11-06 | Stop reason: SDUPTHER

## 2024-11-05 RX ADMIN — FERROUS SULFATE TAB 325 MG (65 MG ELEMENTAL FE) 325 MG: 325 (65 FE) TAB at 08:36

## 2024-11-05 RX ADMIN — Medication 10 ML: at 08:36

## 2024-11-05 RX ADMIN — FAMOTIDINE 40 MG: 20 TABLET ORAL at 08:36

## 2024-11-05 RX ADMIN — HYDROCODONE BITARTRATE AND ACETAMINOPHEN 1 TABLET: 7.5; 325 TABLET ORAL at 09:52

## 2024-11-05 RX ADMIN — SENNOSIDES AND DOCUSATE SODIUM 2 TABLET: 50; 8.6 TABLET ORAL at 08:36

## 2024-11-05 RX ADMIN — SODIUM CHLORIDE 2000 MG: 9 INJECTION, SOLUTION INTRAVENOUS at 05:41

## 2024-11-05 NOTE — PLAN OF CARE
Goal Outcome Evaluation:  Plan of Care Reviewed With: patient        Progress: no change  Outcome Evaluation: Patient presents with limitations affecting strength, activity tolerance, and balance impacting patient's ability to return home safely and independently.  The skills of a therapist will be required to safely and effectively implement the following treatment plan to restore maximal level of function    Anticipated Discharge Disposition (OT): home with outpatient therapy services

## 2024-11-05 NOTE — THERAPY EVALUATION
Patient Name: Agus Church  : 1960    MRN: [id number]                              Today's Date: 2024       Admit Date: 2024    Visit Dx:     ICD-10-CM ICD-9-CM   1. Status post reverse total replacement of right shoulder  Z96.611 V43.61   2. Preop examination  Z01.818 V72.84     Patient Active Problem List   Diagnosis    History of colon polyps    Primary osteoarthritis of right knee    Hypertension    Preop examination    S/p reverse total shoulder arthroplasty     Past Medical History:   Diagnosis Date    Anxiety     Colon polyp     REMOVED, BENIGN    Depression     Detached retina     TARAH, NON RECENT ISSUES    Gout     Hypercholesterolemia     Hypertension 12/15/2023     Past Surgical History:   Procedure Laterality Date    COLONOSCOPY N/A     COLONOSCOPY N/A 10/25/2023    Procedure: COLONOSCOPY WITH COLD SNARE POLYPECTOMY;  Surgeon: Anthony Berman MD;  Location: [facility] ENDOSCOPY;  Service: Gastroenterology;  Laterality: N/A;  COLON POLYP    CYST REMOVAL      leg    HERNIA REPAIR      x2    RETINAL DETACHMENT REPAIR Bilateral     TONSILLECTOMY      TOTAL KNEE ARTHROPLASTY Right 12/15/2023    Procedure: RIGHT TOTAL KNEE ARTHROPLASTY.;  Surgeon: Jacqueline Jack MD;  Location: [facility] MAIN OR;  Service: Orthopedics;  Laterality: Right;    VASECTOMY        General Information       Row Name 24 0902 24 0852       OT Time and Intention    Document Type therapy note (daily note)  -PG evaluation  -PG    Mode of Treatment individual therapy;occupational therapy  -PG individual therapy;occupational therapy  -PG    Total Minutes, Occupational Therapy -- 25  -PG    Patient Effort -- good  -PG      Row Name 24 0852          General Information    Prior Level of Function independent:;transfer;ADL's  -PG     Existing Precautions/Restrictions non-weight bearing  -PG     Barriers to Rehab none identified  -PG       Row Name 24 0852          Occupational Profile    Reason for  Services/Referral (Occupational Profile) Patient is a pleasant 64-year-old male admitted for a right reverse total shoulder arthroplasty.  Patient is being evaluated by Occupational Therapy due to recent decline in ADL function.  No previous OT services identified  -       Row Name 11/05/24 0852          Living Environment    People in Home spouse  -PG       Row Name 11/05/24 0852          Cognition    Orientation Status (Cognition) oriented x 3  -       Row Name 11/05/24 0852          Safety Issues/Impairments Affecting Functional Mobility    Impairments Affecting Function (Mobility) strength;pain;range of motion (ROM)  -PG               User Key  (r) = Recorded By, (t) = Taken By, (c) = Cosigned By      Initials Name Provider Type    PG Marquise Carter, OT Occupational Therapist                     Mobility/ADL's       Row Name 11/05/24 0855          Transfers    Transfers bed-chair transfer  -       Row Name 11/05/24 0855          Bed-Chair Transfer    Bed-Chair [place] (Transfers) independent  -       Row Name 11/05/24 0855          Activities of Daily Living    BADL Assessment/Intervention bathing;upper body dressing;lower body dressing;grooming;toileting  -       Row Name 11/05/24 0902 11/05/24 0855       Bathing Assessment/Intervention    [place] Level (Bathing) bathing skills;minimum assist (75% patient effort)  -PG bathing skills;minimum assist (75% patient effort)  -      Row Name 11/05/24 0902 11/05/24 0855       Upper Body Dressing Assessment/Training    [place] Level (Upper Body Dressing) upper body dressing skills;minimum assist (75% patient effort);verbal cues  -PG upper body dressing skills;minimum assist (75% patient effort)  -      Row Name 11/05/24 0902 11/05/24 0855       Lower Body Dressing Assessment/Training    [place] Level (Lower Body Dressing) lower body dressing skills;minimum assist (75% patient effort);verbal cues  -PG lower body dressing skills;minimum  assist (75% patient effort)  -PG      Row Name 11/05/24 0902 11/05/24 0855       Grooming Assessment/Training    [place] Level (Grooming) grooming skills;set up  -PG grooming skills;set up  -PG      Row Name 11/05/24 0902 11/05/24 0855       Toileting Assessment/Training    [place] Level (Toileting) toileting skills;independent  -PG toileting skills;independent  -PG              User Key  (r) = Recorded By, (t) = Taken By, (c) = Cosigned By      Initials Name Provider Type    PG Marquise Carter OT Occupational Therapist                   Obj/Interventions       Row Name 11/05/24 0856          Sensory Assessment (Somatosensory)    Sensory Assessment (Somatosensory) sensation intact  -PG       Row Name 11/05/24 0856          Vision Assessment/Intervention    Visual Impairment/Limitations WFL  -PG       Row Name 11/05/24 0856          Range of Motion Comprehensive    General Range of Motion no range of motion deficits identified  -PG       Row Name 11/05/24 0856          Strength Comprehensive (MMT)    General Manual Muscle Testing (MMT) Assessment no strength deficits identified  -PG       Row Name 11/05/24 0903          Shoulder (Therapeutic Exercise)    Shoulder (Therapeutic Exercise) pendulum exercises  -PG     Shoulder Pendulum Exercises (Therapeutic Exercise) 30 seconds duration;standing;right  -PG       Row Name 11/05/24 0903          Elbow/Forearm (Therapeutic Exercise)    Elbow/Forearm (Therapeutic Exercise) AROM (active range of motion)  -PG       Row Name 11/05/24 0903 11/05/24 0856       Motor Skills    Motor Skills -- coordination;functional endurance  -PG    Coordination -- WFL  -PG    Functional Endurance -- good  -PG    Therapeutic Exercise shoulder;elbow/forearm  -PG --              User Key  (r) = Recorded By, (t) = Taken By, (c) = Cosigned By      Initials Name Provider Type    Marquise Phelps OT Occupational Therapist                   Goals/Plan       Row Name 11/05/24 0857           Transfer Goal 1 (OT)    Activity/Assistive Device (Transfer Goal 1, OT) transfers, all  -PG     [place] Level/Cues Needed (Transfer Goal 1, OT) modified independence  -PG     Time Frame (Transfer Goal 1, OT) long term goal (LTG);10 days  -PG       Row Name 11/05/24 0857          Bathing Goal 1 (OT)    Activity/Device (Bathing Goal 1, OT) bathing skills, all  -PG     [place] Level/Cues Needed (Bathing Goal 1, OT) modified independence  -PG     Time Frame (Bathing Goal 1, OT) long term goal (LTG);10 days  -PG       Row Name 11/05/24 0857          Dressing Goal 1 (OT)    Activity/Device (Dressing Goal 1, OT) dressing skills, all  -PG     [place]/Cues Needed (Dressing Goal 1, OT) modified independence  -PG     Time Frame (Dressing Goal 1, OT) long term goal (LTG);10 days  -PG       Row Name 11/05/24 0857          Toileting Goal 1 (OT)    Activity/Device (Toileting Goal 1, OT) toileting skills, all  -PG     [place] Level/Cues Needed (Toileting Goal 1, OT) modified independence  -PG     Time Frame (Toileting Goal 1, OT) long term goal (LTG);10 days  -PG       Row Name 11/05/24 0857          Grooming Goal 1 (OT)    Activity/Device (Grooming Goal 1, OT) grooming skills, all  -PG     [place] (Grooming Goal 1, OT) modified independence  -PG     Time Frame (Grooming Goal 1, OT) long term goal (LTG);10 days  -PG       Row Name 11/05/24 0857          Therapy Assessment/Plan (OT)    Planned Therapy Interventions (OT) BADL retraining;transfer/mobility retraining;patient/caregiver education/training;occupation/activity based interventions;ROM/therapeutic exercise  -PG               User Key  (r) = Recorded By, (t) = Taken By, (c) = Cosigned By      Initials Name Provider Type    PG Marquise Carter OT Occupational Therapist                   Clinical Impression       Row Name 11/05/24 0856          Pain Assessment    Pretreatment Pain Rating 2/10  -PG     Posttreatment Pain Rating 2/10  -PG     Pain Location  shoulder  -PG     Pain Side/Orientation right  -PG       Row Name 11/05/24 0856          Plan of Care Review    Plan of Care Reviewed With patient  -PG     Progress no change  -PG     Outcome Evaluation Patient presents with limitations affecting strength, activity tolerance, and balance impacting patient's ability to return home safely and independently.  The skills of a therapist will be required to safely and effectively implement the following treatment plan to restore maximal level of function  -PG       Row Name 11/05/24 0856          Therapy Assessment/Plan (OT)    Patient/Family Therapy Goal Statement (OT) Be able to use right arm without pain  -PG     Rehab Potential (OT) good  -PG     Criteria for Skilled Therapeutic Interventions Met (OT) yes;meets criteria;skilled treatment is necessary  -PG     Therapy Frequency (OT) 5 times/wk  -PG       Row Name 11/05/24 0856          Therapy Plan Review/Discharge Plan (OT)    Anticipated Discharge Disposition (OT) home with outpatient therapy services  -PG               User Key  (r) = Recorded By, (t) = Taken By, (c) = Cosigned By      Initials Name Provider Type    PG Marquise Carter, OT Occupational Therapist                   Outcome Measures       Row Name 11/05/24 0859          How much help from another is currently needed...    Putting on and taking off regular lower body clothing? 3  -PG     Bathing (including washing, rinsing, and drying) 3  -PG     Toileting (which includes using toilet bed pan or urinal) 3  -PG     Putting on and taking off regular upper body clothing 4  -PG     Taking care of personal grooming (such as brushing teeth) 4  -PG     Eating meals 4  -PG     AM-PAC 6 Clicks Score (OT) 21  -PG       Row Name 11/05/24 0859          Functional Assessment    Outcome Measure Options AM-PAC 6 Clicks Daily Activity (OT);Optimal Instrument  -PG       Row Name 11/05/24 0859          Optimal Instrument    Optimal Instrument Optimal - 3  -PG      Bending/Stooping 2  -PG     Standing 1  -PG     Reaching 2  -PG     From the list, choose the 3 activities you would most like to be able to do without any difficulty Bending/stooping;Standing;Reaching  -PG     Total Score Optimal - 3 5  -PG               User Key  (r) = Recorded By, (t) = Taken By, (c) = Cosigned By      Initials Name Provider Type    PG Marquise Carter OT Occupational Therapist                    Occupational Therapy Education       Title: PT OT SLP Therapies (Done)       Topic: Occupational Therapy (Done)       Point: ADL training (Done)       Description:   Instruct learner(s) on proper safety adaptation and remediation techniques during self care or transfers.   Instruct in proper use of assistive devices.                  Learning Progress Summary            Patient Acceptance, E,D, DU by PG at 11/5/2024 0900                      Point: Home exercise program (Done)       Description:   Instruct learner(s) on appropriate technique for monitoring, assisting and/or progressing therapeutic exercises/activities.                  Learning Progress Summary            Patient Acceptance, E,D, DU by PG at 11/5/2024 0900                      Point: Precautions (Done)       Description:   Instruct learner(s) on prescribed precautions during self-care and functional transfers.                  Learning Progress Summary            Patient Acceptance, E,D, DU by PG at 11/5/2024 0900                      Point: Body mechanics (Done)       Description:   Instruct learner(s) on proper positioning and spine alignment during self-care, functional mobility activities and/or exercises.                  Learning Progress Summary            Patient Acceptance, E,D, DU by PG at 11/5/2024 0900                                      User Key       Initials Effective Dates Name Provider Type Discipline    PG 06/16/21 -  Marquise Carter OT Occupational Therapist OT                  OT Recommendation and Plan  Planned Therapy  Interventions (OT): BADL retraining, transfer/mobility retraining, patient/caregiver education/training, occupation/activity based interventions, ROM/therapeutic exercise  Therapy Frequency (OT): 5 times/wk  Plan of Care Review  Plan of Care Reviewed With: patient  Progress: no change  Outcome Evaluation: Patient presents with limitations affecting strength, activity tolerance, and balance impacting patient's ability to return home safely and independently.  The skills of a therapist will be required to safely and effectively implement the following treatment plan to restore maximal level of function     Time Calculation:   Evaluation Complexity (OT)  Review Occupational Profile/Medical/Therapy History Complexity: brief/low complexity  Assessment, Occupational Performance/Identification of Deficit Complexity: 1-3 performance deficits  Clinical Decision Making Complexity (OT): problem focused assessment/low complexity  Overall Complexity of Evaluation (OT): low complexity     Time Calculation- OT       Row Name 11/05/24 0904             Time Calculation- OT    OT Received On 11/05/24  -PG      OT Goal Re-Cert Due Date 11/14/24  -PG         Timed Charges    44842 - OT Therapeutic Exercise Minutes 10  -PG      48430 - OT Self Care/Mgmt Minutes 15  -PG         Untimed Charges    OT Eval/Re-eval Minutes 20  -PG         Total Minutes    Timed Charges Total Minutes 25  -PG      Untimed Charges Total Minutes 20  -PG       Total Minutes 45  -PG                User Key  (r) = Recorded By, (t) = Taken By, (c) = Cosigned By      Initials Name Provider Type    PG Marquise Carter OT Occupational Therapist                  Therapy Charges for Today       Code Description Service Date Service Provider Modifiers Qty    [id number]  OT THER PROC EA 15 MIN 11/5/2024 Marquise Carter OT GO 1    [id number]  OT SELF CARE/MGMT/TRAIN EA 15 MIN 11/5/2024 Marquise Carter OT GO 1    [id number]  OT EVAL LOW COMPLEXITY 2 11/5/2024 Raul  Marquise, OT GO 1                 Marquise Carter, NIDIA  11/5/2024

## 2024-11-05 NOTE — SIGNIFICANT NOTE
11/05/24 0745   Plan   Final Discharge Disposition Code 01 - home or self-care   Final Note PT Enrico Ruff. Appt: 11/11/24 at 9AM.

## 2024-11-05 NOTE — PROGRESS NOTES
Orthopedic reverse total Shoulder Progress Note    Subjective outpatient PT in [place], follow-up 2 weeks, range of motion    Post-Operative Day: 1 post-right total shoulder arthroplasty  Systemic or Specific Complaints: No Complaints    Objective     Vital signs in last 24 hours:  Temp:  [96.5 °F (35.8 °C)-98.2 °F (36.8 °C)] 97.3 °F (36.3 °C)  Heart Rate:  [62-86] 64  Resp:  [14-18] 16  BP: (104-138)/(58-90) 114/73  FiO2 (%):  [100 %] 100 %    General: alert, appears stated age, and cooperative   Neurovascular: Radial nerve: Intact, Ulnar nerve: Intact, and Median nerve: Intact  Brachial pulse: , Radial pulse: , and Capillary refill: Normal   Wound: Wound clean and dry no evidence of infection.   Range of Motion: Limited flexion and Limited extension     Data Review  CBC:  Results from last 7 days   Lab Units 11/05/24  0416   HEMOGLOBIN g/dL 13.6   HEMATOCRIT % 40.2       Assessment & Plan     Status post-right total shoulder arthroplasty: Doing well postoperatively.     Pain Relief: some relief    Discharge today, Return to Clinic: 2 weeks    Activity: up with assistance     LOS: 0 days     Jacqueline Jack MD    Date: 11/5/2024  Time: 07:32 EST

## 2024-11-05 NOTE — PROGRESS NOTES
[facility]     Progress Note    Patient Name: Agus Church  : 1960  MRN: [id number]  Primary Care Physician:  Maria Elena Jimenez MD  Date of admission: 2024      Subjective   Brief summary.  Right shoulder pain      HPI:  64-year-old male electively admitted for shoulder replacement.  Patient is postop, awake alert and oriented, no complaints of nausea vomiting, chest pain or shortness of breath, numbness of the shoulder noted.    Review of Systems     Fatigue, minimal hoarse voice.  No chest pain no shortness of breath.      Objective     Vitals:   Temp:  [96.5 °F (35.8 °C)-98.1 °F (36.7 °C)] 97.7 °F (36.5 °C)  Heart Rate:  [62-86] 85  Resp:  [14-18] 16  BP: (104-138)/(58-90) 116/73  Flow (L/min) (Oxygen Therapy):  [2-2.5] 2.5  FiO2 (%):  [100 %] 100 %    Physical Exam :     Elderly male looks younger than stated age, not in acute distress.  Neck supple.  Heart regular.  Lungs clear.  Abdomen soft right shoulder and surgical dressing.  Peripheral pulses intact, patient has diminished sensations on the right side.      Result Review:  I have personally reviewed the results from the time of this admission to 2024 20:10 EST and agree with these findings:  []  Laboratory  []  Microbiology  [x]  Radiology  []  EKG/Telemetry   []  Cardiology/Vascular   []  Pathology  []  Old records  []  Other:           Assessment / Plan       Active Hospital Problems:  Active Hospital Problems    Diagnosis     S/p reverse total shoulder arthroplasty     Hypertension        Plan:   Patient is postop for shoulder surgery.  Shoulder replacement in.  Awake alert and oriented no immediate intra or postop complication.  Resume essential home meds.  DVT and GI prophylaxis per protocol, PT OT.  Discharge home in a.m. evaluation.       VTE Prophylaxis:  Mechanical VTE prophylaxis orders are present.        CODE STATUS:        Full code    Saul Magallanes MD 24 20:10 EST

## 2024-11-05 NOTE — TELEPHONE ENCOUNTER
PATIENTS WIFE CALLED STATING PATIENT WAS DISCHARGED FROM THE HOSPITAL THIS AFTERNOON AT 12:30. PATIENT WAS GIVEN PAIN MEDICATION AT 0930 AND AGAIN AT 1230. PATIENTS WIFE STATED SHE JUST GAVE PATIENT ANOTHER DOSE OF PAIN MEDICATION AS INSTUCTED PER HOSPITAL DISCHARGE. PATIENTS WIFE REQUESTING INSTRUCTIONS ON HOW TO PROCEED WITH PAIN MEDICATION. ADVISED PATIENTS WIFE PATIENT MAY HAVE 2 TABLETS EVERY 4 HOURS AS NEEDED FOR THE FIRDT 24-48 HOURS TO HELP WITH PAIN CONTROL. PATIENTS WIFE ADVISED TO ICE SHOULDER AS INSTRUCTED AT DISCHARGE. ADVISED PATIENTS WIFE TO CALL OFFICE TOMORROW WITH AN UPDATE IN SYMPTOMS. PATIENT WIFE VERBALIZED UNDERSTANDING.

## 2024-11-05 NOTE — PLAN OF CARE
Goal Outcome Evaluation:  Plan of Care Reviewed With: patient           Outcome Evaluation: Pt did not demonstrate any safety or mobility deficits during the initial evaluation.  Recommend patient follow-up with outpatient physical therapy services to address his decreased shoulder strength and range of motion postoperatively    Anticipated Discharge Disposition (PT): home

## 2024-11-05 NOTE — PLAN OF CARE
Goal Outcome Evaluation:              Outcome Evaluation: Patient alert and oriented x 4, calm, cooperative, and pleasant.  VS WNL.  LCTA.  Complaints of pain x 1 this shift that was addressed with meds, ice, pillow placement, and repositioning.  Patient states interventions effective.  Patient participated in therapy services this shift.  Discharge instructions provided to and reviewed with patient.  Patient states understanding.  Patient discharged home with all personal belongings with wife via POV.

## 2024-11-05 NOTE — PROGRESS NOTES
[facility]     Progress Note    Patient Name: Agus Church  : 1960  MRN: [id number]  Primary Care Physician:  Maria Elena Jimenez MD  Date of admission: 2024      Subjective   Brief summary.  Patient admitted for elective shoulder replacement surgery yesterday      HPI:  Post shoulder replacement doing much better not much of pain slept better.  Numbness has resolved in the hand.    Review of Systems     No chest pain no nausea no shortness of breath      Objective     Vitals:   Temp:  [96.5 °F (35.8 °C)-98.2 °F (36.8 °C)] 97.3 °F (36.3 °C)  Heart Rate:  [62-86] 69  Resp:  [14-18] 18  BP: (104-138)/(58-90) 114/78  Flow (L/min) (Oxygen Therapy):  [2-2.5] 2.5  FiO2 (%):  [100 %] 100 %    Physical Exam :     Well-developed well-nourished male not in acute distress.  Heart regular.  Lungs clear.  Right upper extremity good handgrip and peripheral pulses intact surgical site clean      Result Review:  I have personally reviewed the results from the time of this admission to 2024 09:57 EST and agree with these findings:  [x]  Laboratory  []  Microbiology  [x]  Radiology  []  EKG/Telemetry   []  Cardiology/Vascular   []  Pathology  []  Old records  []  Other:           Assessment / Plan       Active Hospital Problems:  Active Hospital Problems    Diagnosis     S/p reverse total shoulder arthroplasty     Hypertension        Plan:   Stable, postsurgery, postop day #1.  Seen by orthopedic surgeon and cleared for discharge.  Discharge meds reviewed and discussed with patient to follow-up with PCP, outpatient PT, follow-up with orthopedic surgeon.       VTE Prophylaxis:  Mechanical VTE prophylaxis orders are present.        CODE STATUS:            Saul Magallanes MD 24 09:57 EST

## 2024-11-05 NOTE — THERAPY EVALUATION
Acute Care - Physical Therapy Initial Evaluation   [facility]     Patient Name: Agus Church  : 1960  MRN: [id number]  Today's Date: 2024    Admit date: 2024     Referring Physician: Saul Magallanes MD     Surgery Date:2024   Procedure(s) (LRB):  RIGHT TOTAL SHOULDER REVERSE ARTHROPLASTY (Right) ;      Visit Dx:     ICD-10-CM ICD-9-CM   1. Status post reverse total replacement of right shoulder  Z96.611 V43.61   2. Preop examination  Z01.818 V72.84   3. Difficulty in walking  R26.2 719.7     Patient Active Problem List   Diagnosis    History of colon polyps    Primary osteoarthritis of right knee    Hypertension    Preop examination    S/p reverse total shoulder arthroplasty     Past Medical History:   Diagnosis Date    Anxiety     Colon polyp     REMOVED, BENIGN    Depression     Detached retina     TARAH, NON RECENT ISSUES    Gout     Hypercholesterolemia     Hypertension 12/15/2023     Past Surgical History:   Procedure Laterality Date    COLONOSCOPY N/A     COLONOSCOPY N/A 10/25/2023    Procedure: COLONOSCOPY WITH COLD SNARE POLYPECTOMY;  Surgeon: Anthony Berman MD;  Location: [facility] ENDOS[facility];  Service: Gastroenterology;  Laterality: N/A;  COLON POLYP    CYST REMOVAL      leg    HERNIA REPAIR      x2    RETINAL DETACHMENT REPAIR Bilateral     TONSILLECTOMY      TOTAL KNEE ARTHROPLASTY Right 12/15/2023    Procedure: RIGHT TOTAL KNEE ARTHROPLASTY.;  Surgeon: Jacqueline Jack MD;  Location: [facility] MAIN OR;  Service: Orthopedics;  Laterality: Right;    VASECTOMY       PT Assessment (Last 12 Hours)       PT Evaluation and Treatment       Row Name 24 0915          Physical Therapy Time and Intention    Subjective Information no complaints  -WOJCIECH     Document Type evaluation  -WOJCIECH     Mode of Treatment individual therapy;physical therapy  -WOJCIECH     Patient Effort good  -WOJCIECH       Row Name 24 0915          General Information    Patient Observations alert;cooperative;agree to therapy   -WOJCIECH     Prior Level of Function independent:;all household mobility;community mobility  -WOJCIECH     Existing Precautions/Restrictions no known precautions/restrictions  -WOJCIECH     Barriers to Rehab none identified  -WOJCIECH       Row Name 11/05/24 0915          Cognition    Orientation Status (Cognition) oriented x 3  -WOJCIECH       Row Name 11/05/24 0915          Range of Motion (ROM)    Range of Motion ROM is WFL;bilateral lower extremities  -WOJCIECH       Row Name 11/05/24 0915          Strength (Manual Muscle Testing)    Strength (Manual Muscle Testing) strength is WFL;bilateral lower extremities  -WOJCIECH       Row Name 11/05/24 0915          Bed Mobility    Bed Mobility bed mobility (all) activities;supine-sit  -WOJCIECH     All Activities, [place] (Bed Mobility) independent  -WOJCIECH     Supine-Sit [place] (Bed Mobility) independent  -WOJCIECH       Row Name 11/05/24 0915          Transfers    Transfers sit-stand transfer  -WOJCIECH       Row Name 11/05/24 0915          Sit-Stand Transfer    Sit-Stand [place] (Transfers) independent  -WOJCIECH       Row Name 11/05/24 0915          Gait/Stairs (Locomotion)    Gait/Stairs Locomotion gait/ambulation independence  -WOJCIECH     [place] Level (Gait) independent  -WOJCIECH     Distance in Feet (Gait) 100  -WOJCIECH     [place] Level (Stairs) independent  -WOJCIECH     Number of Steps (Stairs) 5x2  -WOJCIECH       Row Name 11/05/24 0915          Balance    Balance Assessment standing dynamic balance  -WOJCIECH     Dynamic Standing Balance independent  -WOJCIECH       Row Name             Wound Right anterior shoulder    Wound - Properties Group Side: Right  -RK Orientation: anterior  -RK Location: shoulder  -RK Primary Wound Type: Incision  -RK    Retired Wound - Properties Group Side: Right  -RK Orientation: anterior  -RK Location: shoulder  -RK Primary Wound Type: Incision  -RK    Retired Wound - Properties Group Side: Right  -RK Orientation: anterior  -RK Location: shoulder  -RK Primary Wound Type: Incision  -RK    Retired Wound -  Properties Group Side: Right  -RK Location: shoulder  -RK Primary Wound Type: Incision  -RK      Row Name 11/05/24 0915          Plan of Care Review    Plan of Care Reviewed With patient  -WOJCIECH     Outcome Evaluation Pt did not demonstrate any safety or mobility deficits during the initial evaluation.  Recommend patient follow-up with outpatient physical therapy services to address his decreased shoulder strength and range of motion postoperatively  -WOJCIECH       Row Name 11/05/24 0915          Therapy Assessment/Plan (PT)    Criteria for Skilled Interventions Met (PT) no problems identified which require skilled intervention  -WOJCIECH     Therapy Frequency (PT) evaluation only  -WOJCIECH       Row Name 11/05/24 0915          PT Evaluation Complexity    History, PT Evaluation Complexity no personal factors and/or comorbidities  -WOJCIECH     Examination of Body Systems (PT Eval Complexity) total of 4 or more elements  -WOJCIECH     Clinical Presentation (PT Evaluation Complexity) stable  -WOJCIECH     Clinical Decision Making (PT Evaluation Complexity) low complexity  -WOJCIECH     Overall Complexity (PT Evaluation Complexity) low complexity  -WOJCIECH       Row Name 11/05/24 0915          Therapy Plan Review/Discharge Plan (PT)    Therapy Plan Review (PT) evaluation/treatment results reviewed;participants voiced agreement with care plan;participants included;patient  -WOJCIECH       Row Name 11/05/24 0915          Physical Therapy Goals    Problem Specific Goal Selection (PT) problem specific goal 1, PT  -WOJCIECH       Row Name 11/05/24 0915          Problem Specific Goal 1 (PT)    Problem Specific Goal 1 (PT) Complete PT evaluation  -WOJCIECH     Time Frame (Problem Specific Goal 1, PT) 1 day  -WOJCIECH     Progress/Outcome (Problem Specific Goal 1, PT) goal met  -WOJCIECH               User Key  (r) = Recorded By, (t) = Taken By, (c) = Cosigned By      Initials Name Provider Type    WOJCIECH Ghanshyam Tucker, PT Physical Therapist    Pato Schaefer, RN Registered Nurse                     Physical Therapy Education        No education to display                  PT Recommendation and Plan  Anticipated Discharge Disposition (PT): home  Therapy Frequency (PT): evaluation only  Plan of Care Reviewed With: patient  Outcome Evaluation: Pt did not demonstrate any safety or mobility deficits during the initial evaluation.  Recommend patient follow-up with outpatient physical therapy services to address his decreased shoulder strength and range of motion postoperatively   Outcome Measures       Row Name 11/05/24 0900             How much help from another person do you currently need...    Turning from your back to your side while in flat bed without using bedrails? 4  -WOJCIECH      Moving from lying on back to sitting on the side of a flat bed without bedrails? 4  -WOJCIECH      Moving to and from a bed to a chair (including a wheelchair)? 4  -WOJCIECH      Standing up from a chair using your arms (e.g., wheelchair, bedside chair)? 4  -WOJCIECH      Climbing 3-5 steps with a railing? 4  -WOJCIECH      To walk in hospital room? 4  -WOJCIECH      AM-PAC 6 Clicks Score (PT) 24  -WOJCIECH         Functional Assessment    Outcome Measure Options AM-PAC 6 Clicks Basic Mobility (PT)  -WOJCIECH                User Key  (r) = Recorded By, (t) = Taken By, (c) = Cosigned By      Initials Name Provider Type    Ghanshyam Lewis PT Physical Therapist                     Time Calculation:    PT Charges       Row Name 11/05/24 0924             Time Calculation    PT Received On 11/05/24  -WOJCIECH         Untimed Charges    PT Eval/Re-eval Minutes 30  -WOJCIECH         Total Minutes    Untimed Charges Total Minutes 30  -WOJCIECH       Total Minutes 30  -WOJCIECH                User Key  (r) = Recorded By, (t) = Taken By, (c) = Cosigned By      Initials Name Provider Type    Ghanshyam Lewis PT Physical Therapist                      PT G-Codes  Outcome Measure Options: AM-PAC 6 Clicks Basic Mobility (PT)  AM-PAC 6 Clicks Score (PT): 24  AM-PAC 6 Clicks Score (OT): 21    Ghanshyam  Garrett, PT  11/5/2024

## 2024-11-05 NOTE — PLAN OF CARE
Goal Outcome Evaluation:  Plan of Care Reviewed With: patient           Outcome Evaluation: Pt complained of pain once during the shift, which was managed with oral PRN medication. VSS. PT was able to ambulate without difficulty. PT is expecting to go home, and use PT Practice in [place].

## 2024-11-06 ENCOUNTER — TELEPHONE (OUTPATIENT)
Dept: ORTHOPEDIC SURGERY | Facility: CLINIC | Age: 64
End: 2024-11-06
Payer: MEDICARE

## 2024-11-06 DIAGNOSIS — Z96.611 STATUS POST REVERSE TOTAL REPLACEMENT OF RIGHT SHOULDER: ICD-10-CM

## 2024-11-06 RX ORDER — HYDROCODONE BITARTRATE AND ACETAMINOPHEN 7.5; 325 MG/1; MG/1
1 TABLET ORAL EVERY 4 HOURS PRN
Qty: 42 TABLET | Refills: 0 | Status: SHIPPED | OUTPATIENT
Start: 2024-11-06

## 2024-11-06 NOTE — TELEPHONE ENCOUNTER
POST OP PHONE CALL COMPLETED-- PATIENT REQUESTING REFILL PRIOR TO WEEKEND. PATIENT HAS HAD TO TAKE 2 EVERY 4 HOURS OCCASIONALLY AS HE HAD A HARD TIME GETTING HIS PAIN UNDER CONTROL AFTER THE BLOCK WORE OFF.   AMADEO AMADOR.

## 2024-11-21 ENCOUNTER — OFFICE VISIT (OUTPATIENT)
Dept: ORTHOPEDIC SURGERY | Facility: CLINIC | Age: 64
End: 2024-11-21
Payer: MEDICARE

## 2024-11-21 VITALS
OXYGEN SATURATION: 94 % | HEART RATE: 68 BPM | BODY MASS INDEX: 33.44 KG/M2 | DIASTOLIC BLOOD PRESSURE: 70 MMHG | SYSTOLIC BLOOD PRESSURE: 110 MMHG | HEIGHT: 72 IN | WEIGHT: 246.91 LBS

## 2024-11-21 DIAGNOSIS — Z96.611 AFTERCARE FOLLOWING RIGHT SHOULDER JOINT REPLACEMENT SURGERY: Primary | ICD-10-CM

## 2024-11-21 DIAGNOSIS — Z47.1 AFTERCARE FOLLOWING RIGHT SHOULDER JOINT REPLACEMENT SURGERY: Primary | ICD-10-CM

## 2024-11-21 DIAGNOSIS — Z96.611 S/P REVERSE TOTAL SHOULDER ARTHROPLASTY, RIGHT: ICD-10-CM

## 2024-11-21 DIAGNOSIS — M25.511 RIGHT SHOULDER PAIN, UNSPECIFIED CHRONICITY: ICD-10-CM

## 2024-11-21 NOTE — PROGRESS NOTES
"Chief Complaint  Pain and Follow-up of the Right Shoulder and Suture / Staple Removal    Subjective      Agus Church presents to [facility] for follow up of his right shoulder.  Patient is 2-week status post right total shoulder reverse arthroplasty performed Dr. Jack on 11/4/24. He arrives today with his sling. He is attending PT and PT Practices. He states he hasn't had to take and pain medication. He is overall doing really good.     Allergies   Allergen Reactions    Ace Inhibitors Cough       Objective     Vital Signs:   Vitals:    11/21/24 0907   BP: 110/70   Pulse: 68   SpO2: 94%   Weight: 112 kg (246 lb 14.6 oz)   Height: 182.9 cm (72\")     Body mass index is 33.49 kg/m².    I reviewed the patient's chief complaint, history of present illness, review of systems, past medical history, surgical history, family history, social history, medications, and allergy list.     Ortho Exam    General: Alert. No acute distress.  Right upper Extremity: Staples removed today in office. Steri-strips applied. Incision well-healing without redness, warmth or active drainage.  Tender to palpation. No skin discoloration, atrophy, or swelling.  Demonstrates intact active elbow ROM. Demonstrates intact active wrist ROM. Sensation intact. Palpable radial pulse. Neurovascularly intact.            Imaging Results (Most Recent)       Procedure Component Value Units Date/Time    XR Scapula Right [[id number]] Resulted: 11/21/24 1430     Updated: 11/21/24 1430    Narrative:      X-Ray Report:  Study: X-rays ordered, taken in the office, and reviewed today  Site: right shoulder Xray  Indication: Right total shoulder reverse arthroplasty  View: AP/Lateral view(s)  Findings: Stable and intact right total shoulder reverse arthroplasty. No   evidence of hardware complications or loosening. No periprosthetic   fractures.    Prior studies available for comparison: yes                 Assessment and Plan "   Diagnoses and all orders for this visit:    1. Aftercare following right shoulder joint replacement surgery (Primary)  -     XR Scapula Right    2. S/P reverse total shoulder arthroplasty, right    3. Right shoulder pain, unspecified chronicity         Agus A Ranjit presents today 2 weeks postop right reverse total shoulder arthroplasty performed by Dr. Jack on 11//24. X-rays reviewed with the patient today showing intact hardware.  Sutures/staples removed today without complications. Steri-strips applied. Incision is well-healing.  No active redness or drainage noted. No concerning signs of infection. Incision site care was reviewed today. Patient instructed not to soak or submerge incision. Do not apply any lotions, creams, or ointments to the incision at this time.  Allow steri-strips to fall off on their own in 7-10 days. May remove after 10 days if still intact. We discussed concerning signs and symptoms regarding the incision site and when to contact/return to office.     Patient will continue sling full-time over the next 4 weeks, removing it only for range of motion and hygiene. We discussed continuing gentle shoulder range of motion exercises including pendulums; also discussed maintaining wrist and elbow range of motion. Exercises were demonstrated in office today. Patient is to avoid active range of motion with the shoulder. Patient instructed to avoid lifting, pushing, or pulling with the right upper extremity.  Patient is to continue formal physical therapy.  Continue with aspirin for DVT prophylaxis until 4 weeks postop.     Patient will follow up in 4 weeks for reevaluation.  No xrays needed.      Call or return if symptoms worsen or patient has any concerns.        Tobacco Use: Low Risk  (11/21/2024)    Patient History     Smoking Tobacco Use: Never     Smokeless Tobacco Use: Never     Passive Exposure: Not on file     Patient reports that they are a nonsmoker; cessation education not applicable.             Follow Up   Return in about 1 month (around 12/21/2024).  There are no Patient Instructions on file for this visit.    Patient was given instructions and counseling regarding his condition or for health maintenance advice. Please see specific information pulled into the AVS if appropriate.       Dictated Utilizing Dragon Dictation. Please note that portions of this note were completed with a voice recognition program. Part of this note may be an electronic transcription/translation of spoken language to printed text using the Dragon Dictation System.                                            Answers submitted by the patient for this visit:  Primary Reason for Visit (Submitted on 11/19/2024)  What is the primary reason for your visit?: Problem Not Listed  Problem not listed (Submitted on 11/19/2024)  Chief Complaint: Other medical problem  Reason for appointment: Post operation follow up  abdominal pain: No  anorexia: No  joint pain: Yes  change in stool: No  chest pain: No  chills: No  nasal congestion: No  cough: No  diaphoresis: No  fatigue: No  fever: No  headaches: No  joint swelling: No  myalgias: No  nausea: No  neck pain: No  numbness: No  rash: No  sore throat: No  swollen glands: No  dysuria: No  vertigo: No  visual change: No  vomiting: No  weakness: No  Additional information: Post operative follow up that I anticipate the removal of my staples in my shoulder and overseeing healing.

## 2024-12-03 ENCOUNTER — TELEPHONE (OUTPATIENT)
Dept: ORTHOPEDIC SURGERY | Facility: CLINIC | Age: 64
End: 2024-12-03
Payer: MEDICARE

## 2024-12-03 NOTE — TELEPHONE ENCOUNTER
Caller: [facility]    Relationship: PROVIDER    Best call back number: [phone]    What is the best time to reach you: ANY    Who are you requesting to speak with (clinical staff, provider,  specific staff member):     What was the call regarding: SHE IS TRYING TO AUTHORIZE PHYSICAL THERAPY- INSURANCE CHANGED 12/1/24 [facility] MEDICARE ADVANTAGE PLAN WANTS DR BESS TAX ID- PLEASE CALL

## 2024-12-13 ENCOUNTER — OFFICE VISIT (OUTPATIENT)
Dept: ORTHOPEDIC SURGERY | Facility: CLINIC | Age: 64
End: 2024-12-13
Payer: MEDICARE

## 2024-12-13 VITALS
BODY MASS INDEX: 33.44 KG/M2 | SYSTOLIC BLOOD PRESSURE: 117 MMHG | HEART RATE: 71 BPM | OXYGEN SATURATION: 96 % | DIASTOLIC BLOOD PRESSURE: 78 MMHG | HEIGHT: 72 IN | WEIGHT: 246.91 LBS

## 2024-12-13 DIAGNOSIS — M25.561 RIGHT KNEE PAIN, UNSPECIFIED CHRONICITY: ICD-10-CM

## 2024-12-13 DIAGNOSIS — Z96.651 S/P TOTAL KNEE ARTHROPLASTY, RIGHT: Primary | ICD-10-CM

## 2024-12-13 PROCEDURE — 1160F RVW MEDS BY RX/DR IN RCRD: CPT

## 2024-12-13 PROCEDURE — 3074F SYST BP LT 130 MM HG: CPT

## 2024-12-13 PROCEDURE — 3078F DIAST BP <80 MM HG: CPT

## 2024-12-13 PROCEDURE — 1159F MED LIST DOCD IN RCRD: CPT

## 2024-12-13 PROCEDURE — 99024 POSTOP FOLLOW-UP VISIT: CPT

## 2024-12-16 NOTE — PROGRESS NOTES
"Chief Complaint  Follow-up of the Right Knee    Subjective      Agus KHUSHI Church presents to [facility] for follow up of his right knee.  They are 1 year(s) postop right total knee arthroplasty performed by Dr. Jack on/15/23.     Allergies   Allergen Reactions    Ace Inhibitors Cough       Objective     Vital Signs:   Vitals:    12/13/24 0935   BP: 117/78   Pulse: 71   SpO2: 96%   Weight: 112 kg (246 lb 14.6 oz)   Height: 182.9 cm (72\")     Body mass index is 33.49 kg/m².    I reviewed the patient's chief complaint, history of present illness, review of systems, past medical history, surgical history, family history, social history, medications, and allergy list.     Ortho Exam  Knee   General: Alert, no acute distress.   Right knee: Well-healed incision.  Knee stable to varus/valgus stress.  Knee extensor mechanism intact.  0 degrees knee extension. Flexion to 120 degrees. Calf soft, non-tender.  Sensation and neurovascularly intact.  Demonstrates active ankle dorsiflexion and plantarflexion.  Palpable pedal pulses.               Imaging Results (Most Recent)       Procedure Component Value Units Date/Time    XR Knee 3 View Right [[id number]] Resulted: 12/13/24 1014     Updated: 12/13/24 1014    Narrative:      X-Ray Report:  Study: X-rays ordered, taken in the office, and reviewed today.   Site: Right knee Xray  Indication: Right total knee arthroplasty follow up.   View: AP/Lateral, Standing, and Bobo view(s)  Findings: Intact right total knee arthroplasty. No evidence of hardware   malfunction, complication or loosening. No periprosthetic fractures   visualized. Patella is midline tracking on sunrise view.   Prior studies available for comparison: yes                    Assessment and Plan   Diagnoses and all orders for this visit:    1. S/P total knee arthroplasty, right (Primary)  -     XR Knee 3 View Right    2. Right knee pain, unspecified chronicity         Agus Church is 1 " year(s) post-op right total knee arthroplasty performed by Dr Jack on 12/15/2023. X-rays reviewed, showing hardware intact and no complications.  Patient is doing well. Continue home exercise program to progress strength and ROM.     Discussed the importance of lifelong antibiotic prophylaxis with dental procedures following total joint replacement. In the event of a dental procedure, patient is welcome to contact our office to obtain antibiotics prior to the procedure if their dentist does not provide the prescription.     We also discussed that if a fall/injury were to occur to the affected extremity was advised for patient to obtain x-rays for clarification that no hardware has been compromised or if showing signs of loosening.    Patient verbalized understanding.     Follow-up in 1 year. We will repeat xray's of the prosthetic joint at that time.   Call sooner or return to care, if needed with any changes or concerns.        Tobacco Use: Low Risk  (12/13/2024)    Patient History     Smoking Tobacco Use: Never     Smokeless Tobacco Use: Never     Passive Exposure: Not on file     Patient reports that they are a nonsmoker; cessation education not applicable.            Follow Up   Return in about 1 year (around 12/13/2025).  There are no Patient Instructions on file for this visit.    Patient was given instructions and counseling regarding his condition or for health maintenance advice. Please see specific information pulled into the AVS if appropriate.       Dictated Utilizing Dragon Dictation. Please note that portions of this note were completed with a voice recognition program. Part of this note may be an electronic transcription/translation of spoken language to printed text using the Dragon Dictation System.

## 2025-01-23 ENCOUNTER — OFFICE VISIT (OUTPATIENT)
Dept: ORTHOPEDIC SURGERY | Facility: CLINIC | Age: 65
End: 2025-01-23
Payer: MEDICARE

## 2025-01-23 VITALS
BODY MASS INDEX: 33.32 KG/M2 | HEART RATE: 80 BPM | WEIGHT: 246 LBS | OXYGEN SATURATION: 93 % | SYSTOLIC BLOOD PRESSURE: 129 MMHG | DIASTOLIC BLOOD PRESSURE: 71 MMHG | HEIGHT: 72 IN

## 2025-01-23 DIAGNOSIS — M25.511 RIGHT SHOULDER PAIN, UNSPECIFIED CHRONICITY: ICD-10-CM

## 2025-01-23 DIAGNOSIS — Z96.611 S/P REVERSE TOTAL SHOULDER ARTHROPLASTY, RIGHT: Primary | ICD-10-CM

## 2025-01-23 PROCEDURE — 99024 POSTOP FOLLOW-UP VISIT: CPT

## 2025-01-23 PROCEDURE — 1160F RVW MEDS BY RX/DR IN RCRD: CPT

## 2025-01-23 PROCEDURE — 1159F MED LIST DOCD IN RCRD: CPT

## 2025-01-23 PROCEDURE — 3074F SYST BP LT 130 MM HG: CPT

## 2025-01-23 PROCEDURE — 3078F DIAST BP <80 MM HG: CPT

## 2025-01-23 NOTE — PROGRESS NOTES
"Chief Complaint  Follow-up of the Right Shoulder    Subjective      Agus Church presents to [facility] for follow up of their right shoulder.  He is 11 weeks right total shoulder reverse arthroplasty performed Dr. Mendez in 11/4/2024.  He arrives today stating he is doing well.  He is still in physical therapy and has 2-3 visits left.  He states he has had difficulty getting his shoulder blade to relax so he can do range of motion exercises.  Overall he states that things are improving and he denies any concerns.    Allergies   Allergen Reactions    Ace Inhibitors Cough       Objective     Vital Signs:   Vitals:    01/23/25 0936   BP: 129/71   Pulse: 80   SpO2: 93%   Weight: 112 kg (246 lb)   Height: 182.9 cm (72.01\")     Body mass index is 33.36 kg/m².    I reviewed the patient's chief complaint, history of present illness, review of systems, past medical history, surgical history, family history, social history, medications, and allergy list.     Ortho Exam    General: Alert. No acute distress.  Right upper Extremity: Well-healed incision.  not tender to palpation. No skin discoloration, atrophy, or swelling.   180 degrees active elevation. External rotation to 65 degrees. Internal rotation to mid lumbar.  Demonstrates intact active elbow ROM. Demonstrates intact active wrist ROM. Sensation intact. Palpable radial pulse. Neurovascularly intact.            Imaging Results (Most Recent)       Procedure Component Value Units Date/Time    XR Scapula Right [[id number]] Resulted: 01/23/25 0952     Updated: 01/23/25 0952    Narrative:      X-Ray Report:  Study: X-rays ordered, taken in the office, and reviewed today  Site: Right shoulder xray  Indication: Right total shoulder reverse arthroplasty follow-up  View: AP, Scap Y right shoulder view(s)  Findings: Stable intact right total shoulder reverse arthroplasty.  No   evidence of hardware complications or loosening.  No periprosthetic "   fractures visualized.  Prior studies available for comparison: yes                 Assessment and Plan   Diagnoses and all orders for this visit:    1. S/P reverse total shoulder arthroplasty, right (Primary)  -     XR Scapula Right    2. Right shoulder pain, unspecified chronicity  -     XR Scapula Right         Joey A Ranjit present to [facility] for a follow up of their right shoulder. They are approximately 12 weeks post op right total shoulder reverse arthroplasty performed by Dr Jack on 11/4/2024.  X-rays taken and reviewed with patient today in office. Patient is doing well.  Encouraged to continue home exercises for ROM and strengthening. May continue icing as needed for no more than 20 minutes at a time for comfort and inflammation. Educated patient about using caution with overhead pushing and pulling.     Incision well healed. May apply Vitamin E, cocoa butter, etc. over incision to aid in scar appearance. Educated the patient that numbness over/surrounding incision can still be present at this time. It should continue to improve over the next several months. However, at the 1 year joey, if still experiencing numbness, it may not return.     Discussed the need for antibiotic prophylaxis with dental procedures following total joint replacement for life. Patient instructed to contact office if dental office is reluctant to prescribe antibiotics prior to procedure and we will prescribe them.       Follow-up in 9 months. We will repeat xrays of the right shoulder at that visit.     Call with questions or concerns.            Tobacco Use: Low Risk  (1/23/2025)    Patient History     Smoking Tobacco Use: Never     Smokeless Tobacco Use: Never     Passive Exposure: Not on file     Patient reports that they are a nonsmoker; cessation education not applicable.            Follow Up   Return in about 9 months (around 10/23/2025).  There are no Patient Instructions on file for this visit.    Patient was  given instructions and counseling regarding his condition or for health maintenance advice. Please see specific information pulled into the AVS if appropriate.     Dictated Utilizing Dragon Dictation. Please note that portions of this note were completed with a voice recognition program. Part of this note may be an electronic transcription/translation of spoken language to printed text using the Dragon Dictation System.

## (undated) DEVICE — DRAPE,TOWEL,LARGE,INVISISHIELD: Brand: MEDLINE

## (undated) DEVICE — SOL IRR NACL 0.9PCT 3000ML

## (undated) DEVICE — Device: Brand: DEFENDO AIR/WATER/SUCTION AND BIOPSY VALVE

## (undated) DEVICE — UNDERCAST PADDING: Brand: DEROYAL

## (undated) DEVICE — GLV SURG SENSICARE SLT PF LF 6.5 STRL

## (undated) DEVICE — Device: Brand: PULSAVAC®

## (undated) DEVICE — SUT VIC 2/0 CT1 36IN

## (undated) DEVICE — SOL NACL 0.9PCT 100ML SGL

## (undated) DEVICE — SHOULDER P.A.D. III: Brand: DEROYAL

## (undated) DEVICE — SOL IRRG H2O PL/BG 1000ML STRL

## (undated) DEVICE — ANTIBACTERIAL VIOLET BRAIDED (POLYGLACTIN 910), SYNTHETIC ABSORBABLE SURGICAL SUTURE: Brand: COATED VICRYL

## (undated) DEVICE — NO-SCRATCH ™ SMALL WHITNEY CURETTE ™ IS A SINGLE-USE, PLASTIC CURETTE FOR QUICKLY APPLYING, MANIPULATING AND REMOVING BONE CEMENT DURING HIP AND KNEE REPLACEMENT SURGERY. THE PLASTIC IS SOFTER THAN STEEL INSTRUMENTS, REDUCING THE RISK OF DAMAGING THE PROSTHESIS WITH METAL INSTRUMENTS.  THE CURETTE’S 6MM TIP REMOVES EXCESS CEMENT FROM REPLACEMENT HIPS AND KNEES. EASY-TO-MANEUVER, THE SMALL BLUE CURETTE LETS YOU REMOVE CEMENT FROM ALL EDGES OF THE PROSTHESIS.NO-SCRATCH WHITNEY SMALL CURETTE FEATURES:SAFER THAN STEEL- MADE OF PLASTIC - STURDY YET SOFTER THAN SURGICAL STEEL.HANDIER- EACH TOOL HAS A MOLDED-IN THUMB INDENTATION INSTANTLY ORIENTING THE TOOL.- EASIER TO MANEUVER IN HARD TO SEE PLACES.- COLOR-CODED FOR EASY IDENTIFICATION.FASTER- COMES INDIVIDUALLY PACKAGED IN STERILE, PEEL OPEN POUCH, READY TO GO.- APPLIES, MANIPULATES, OR REMOVES CEMENT WITH FINGERTIP PRECISION.ECONOMICAL- THE COST OF A SINGLE REVISION DWARFS THE COST OF A SINGLE-USE CURETTE. - DISPOSABLE – THERE’S NO NEED TO WASTE TIME REMOVING HARDENED CEMENT OR RE-STERILIZING TOOLS.- LESS EXPENSIVE TO BUY AND INVENTORY - ORDER ONLY THE TOOL YOU USE.- PACKAGED 25 INDIVIDUALLY WRAPPED TOOLS TO A CARTON FOR CONVENIENT SHELF STORAGE.: Brand: WHITNEY NO-SCRATCH CURETTE (SMALL)

## (undated) DEVICE — GLV SURG SENSICARE PI PF LF 7 GRN STRL

## (undated) DEVICE — TOTAL KNEE-LF: Brand: MEDLINE INDUSTRIES, INC.

## (undated) DEVICE — STERILE POLYISOPRENE POWDER-FREE SURGICAL GLOVES WITH EMOLLIENT COATING: Brand: PROTEXIS

## (undated) DEVICE — CVR LEG BOOTLEG F/R NOSKID 33IN

## (undated) DEVICE — GLOVE,SURG,SENSICARE SLT,LF,PF,7: Brand: MEDLINE

## (undated) DEVICE — APPL CHLORAPREP HI/LITE 26ML ORNG

## (undated) DEVICE — DISPOSABLE TOURNIQUET CUFF 30"X4", 1-LINE, WHITE, STERILE, 1EA/PK, 10PK/CS: Brand: ASP MEDICAL

## (undated) DEVICE — GLV SURG BIOGEL LTX PF 8 1/2

## (undated) DEVICE — GAUZE,SPONGE,4"X4",16PLY,STRL,LF,10/TRAY: Brand: MEDLINE

## (undated) DEVICE — TOWEL,OR,DSP,ST,BLUE,STD,4/PK,20PK/CS: Brand: MEDLINE

## (undated) DEVICE — DRP SURG U/DRP INVISISHIELD PA 48X52IN

## (undated) DEVICE — DRAPE,SHOULDER,BEACH ULTRAGARD: Brand: MEDLINE

## (undated) DEVICE — INTENDED FOR TISSUE SEPARATION, AND OTHER PROCEDURES THAT REQUIRE A SHARP SURGICAL BLADE TO PUNCTURE OR CUT.: Brand: BARD-PARKER ® CARBON RIB-BACK BLADES

## (undated) DEVICE — SLV SCD KN/LEN ADJ EXPRSS BLENDED MD 1P/U

## (undated) DEVICE — GLV SURG SENSICARE SLT PF LF 7 STRL

## (undated) DEVICE — BASIC SINGLE BASIN-LF: Brand: MEDLINE INDUSTRIES, INC.

## (undated) DEVICE — Device

## (undated) DEVICE — PENCL ES MEGADINE EZ/CLEAN BUTN W/HOLSTR 10FT

## (undated) DEVICE — PLASMABLADE PS200-040 4.0: Brand: PLASMABLADE™

## (undated) DEVICE — SOL IRR NACL 0.9PCT BO 1000ML

## (undated) DEVICE — STRYKER PERFORMANCE SERIES SAGITTAL BLADE: Brand: STRYKER PERFORMANCE SERIES

## (undated) DEVICE — PULLOVER TOGA, 2X LARGE: Brand: FLYTE, SURGICOOL

## (undated) DEVICE — MAT FLR ABS W/BLU/LINER 56X72IN WHT

## (undated) DEVICE — 3 BONE CEMENT MIXER: Brand: MIXEVAC

## (undated) DEVICE — SYR LUERLOK 30CC

## (undated) DEVICE — PROXIMATE RH ROTATING HEAD SKIN STAPLERS (35 WIDE) CONTAINS 35 STAINLESS STEEL STAPLES: Brand: PROXIMATE

## (undated) DEVICE — POSTN HD UNIV

## (undated) DEVICE — THE SINGLE USE ETRAP – POLYP TRAP IS USED FOR SUCTION RETRIEVAL OF ENDOSCOPICALLY REMOVED POLYPS.: Brand: ETRAP

## (undated) DEVICE — ANTIBACTERIAL UNDYED BRAIDED (POLYGLACTIN 910), SYNTHETIC ABSORBABLE SUTURE: Brand: COATED VICRYL

## (undated) DEVICE — SOLIDIFIER LIQLOC PLS 1500CC BT

## (undated) DEVICE — GLV SURG SENSICARE SLT PF LF 8.5 STRL

## (undated) DEVICE — BNDG ELAS MATRX V/CLS 6INX10YD LF

## (undated) DEVICE — 40580 - THE PINK PAD - ADVANCED TRENDELENBURG POSITIONING KIT: Brand: 40580 - THE PINK PAD - ADVANCED TRENDELENBURG POSITIONING KIT

## (undated) DEVICE — HANDPIECE SET WITH HIGH FLOW TIP AND SUCTION TUBE: Brand: INTERPULSE

## (undated) DEVICE — SNAR E/S POLYP SNAREMASTER OVL/10MM 2.8X2300MM YEL

## (undated) DEVICE — SUT VIC PLS CTD BR 0 TIE 18IN VIL

## (undated) DEVICE — GLOVE,SURG,SENSICARE SLT,LF,PF,8.5: Brand: MEDLINE

## (undated) DEVICE — BIPOLAR SEALER 23-112-1 AQM 6.0: Brand: AQUAMANTYS™

## (undated) DEVICE — NDL HYPO ECLPS SFTY 18G 1 1/2IN

## (undated) DEVICE — DRSNG PAD ABD 8X10IN STRL